# Patient Record
Sex: MALE | Employment: FULL TIME | URBAN - METROPOLITAN AREA
[De-identification: names, ages, dates, MRNs, and addresses within clinical notes are randomized per-mention and may not be internally consistent; named-entity substitution may affect disease eponyms.]

---

## 2018-03-09 ENCOUNTER — TELEPHONE (OUTPATIENT)
Dept: OTHER | Facility: CLINIC | Age: 38
End: 2018-03-09

## 2018-05-31 ENCOUNTER — OFFICE VISIT (OUTPATIENT)
Dept: FAMILY MEDICINE | Facility: CLINIC | Age: 38
End: 2018-05-31
Payer: COMMERCIAL

## 2018-05-31 VITALS
HEIGHT: 72 IN | DIASTOLIC BLOOD PRESSURE: 67 MMHG | SYSTOLIC BLOOD PRESSURE: 109 MMHG | WEIGHT: 166 LBS | HEART RATE: 60 BPM | OXYGEN SATURATION: 97 % | TEMPERATURE: 98.1 F | BODY MASS INDEX: 22.48 KG/M2

## 2018-05-31 DIAGNOSIS — R14.2 FLATULENCE, ERUCTATION, AND GAS PAIN: Primary | ICD-10-CM

## 2018-05-31 DIAGNOSIS — R14.3 FLATULENCE, ERUCTATION, AND GAS PAIN: Primary | ICD-10-CM

## 2018-05-31 DIAGNOSIS — R14.1 FLATULENCE, ERUCTATION, AND GAS PAIN: Primary | ICD-10-CM

## 2018-05-31 LAB
BASOPHILS # BLD AUTO: 0 10E9/L (ref 0–0.2)
BASOPHILS NFR BLD AUTO: 0.2 %
DIFFERENTIAL METHOD BLD: ABNORMAL
EOSINOPHIL # BLD AUTO: 0.2 10E9/L (ref 0–0.7)
EOSINOPHIL NFR BLD AUTO: 4.2 %
ERYTHROCYTE [DISTWIDTH] IN BLOOD BY AUTOMATED COUNT: 12.4 % (ref 10–15)
ERYTHROCYTE [SEDIMENTATION RATE] IN BLOOD BY WESTERGREN METHOD: 9 MM/H (ref 0–15)
HCT VFR BLD AUTO: 42.9 % (ref 40–53)
HGB BLD-MCNC: 14.8 G/DL (ref 13.3–17.7)
LYMPHOCYTES # BLD AUTO: 1.7 10E9/L (ref 0.8–5.3)
LYMPHOCYTES NFR BLD AUTO: 34.5 %
MCH RBC QN AUTO: 31.2 PG (ref 26.5–33)
MCHC RBC AUTO-ENTMCNC: 34.5 G/DL (ref 31.5–36.5)
MCV RBC AUTO: 91 FL (ref 78–100)
MONOCYTES # BLD AUTO: 0.5 10E9/L (ref 0–1.3)
MONOCYTES NFR BLD AUTO: 9.2 %
NEUTROPHILS # BLD AUTO: 2.6 10E9/L (ref 1.6–8.3)
NEUTROPHILS NFR BLD AUTO: 51.9 %
PLATELET # BLD AUTO: 140 10E9/L (ref 150–450)
RBC # BLD AUTO: 4.74 10E12/L (ref 4.4–5.9)
WBC # BLD AUTO: 5 10E9/L (ref 4–11)

## 2018-05-31 PROCEDURE — 85025 COMPLETE CBC W/AUTO DIFF WBC: CPT | Performed by: FAMILY MEDICINE

## 2018-05-31 PROCEDURE — 83516 IMMUNOASSAY NONANTIBODY: CPT | Performed by: FAMILY MEDICINE

## 2018-05-31 PROCEDURE — 86140 C-REACTIVE PROTEIN: CPT | Performed by: FAMILY MEDICINE

## 2018-05-31 PROCEDURE — 80053 COMPREHEN METABOLIC PANEL: CPT | Performed by: FAMILY MEDICINE

## 2018-05-31 PROCEDURE — 84443 ASSAY THYROID STIM HORMONE: CPT | Performed by: FAMILY MEDICINE

## 2018-05-31 PROCEDURE — 36415 COLL VENOUS BLD VENIPUNCTURE: CPT | Performed by: FAMILY MEDICINE

## 2018-05-31 PROCEDURE — 82784 ASSAY IGA/IGD/IGG/IGM EACH: CPT | Performed by: FAMILY MEDICINE

## 2018-05-31 PROCEDURE — 99203 OFFICE O/P NEW LOW 30 MIN: CPT | Performed by: FAMILY MEDICINE

## 2018-05-31 PROCEDURE — 85652 RBC SED RATE AUTOMATED: CPT | Performed by: FAMILY MEDICINE

## 2018-05-31 NOTE — PATIENT INSTRUCTIONS
Look up gassy foods diet.    [facility]   Discharged by : meggan  Paper scripts provided to patient : none     If you have any questions regarding your visit please contact your care team:     Team Gold                Clinic Hours Telephone Number     Dr. Kayley Chatman, NP 7am-7pm  Monday - Thursday   7am-5pm  Fridays  [phone]   (Appointment scheduling available 24/7)     RN Line  [phone] option 2     Urgent Care - [place] and [place] - 11am-9pm Monday-Friday Saturday-Sunday- 9am-5pm     [place] -   5pm-9pm Monday-Friday Saturday-Sunday- 9am-5pm    [phone] - [place]    [phone] - [place]       For a Price Quote for your services, please call our Consumer Price Line at [phone].     What options do I have for visits at the clinic other than the traditional office visit?     To expand how we care for you, many of our providers are utilizing electronic visits (e-visits) and telephone visits, when medically appropriate, for interactions with their patients rather than a visit in the clinic. We also offer nurse visits for many medical concerns. Just like any other service, we will bill your insurance company for this type of visit based on time spent on the phone with your provider. Not all insurance companies cover these visits. Please check with your medical insurance if this type of visit is covered. You will be responsible for any charges that are not paid by your insurance.   E-visits via Brandwatch: generally incur a $35.00 fee.     Telephone visits:  Time spent on the phone: *charged based on time that is spent on the phone in increments of 10 minutes. Estimated cost:   5-10 mins $30.00   11-20 mins. $59.00   21-30 mins. $85.00       Use Brandwatch (secure email communication and access to your chart) to send your primary care provider a message or make an appointment. Ask someone  on your Team how to sign up for PhysioSonics.     As always, Thank you for trusting us with your health care needs!      [facility]:    Scheduling Appointments  [place]  Call: [phone]    Shane Guevara [facility]  Call: [phone]    [facility]  Call: [phone]    For Gastroenterology referrals   [facility]   Clinics and Surgery [place], 4th Floor   [address] [place] [address]   Appointments: [phone]    WHERE TO GO FOR CARE?  Clinic    Make an appointment if you:       Are sick (cold, cough, flu, sore throat, earache or in pain).       Have a small injury (sprain, small cut, burn or broken bone).       Need a physical exam, Pap smear, vaccine or prescription refill.       Have questions about your health or medicines.    To reach us:      Call [phone] ([phone]). Open 24 hours every day. (For counseling services, call [phone].)    Log into PhysioSonics at EnLink [email or web address]. (Visit [email or web address] to create an account.) Hospital emergency room    An emergency is a serious or life- threatening problem that must be treated right away.    Call [phone] or get to the hospital if you have:      Very bad or sudden:            - Chest pain or pressure         - Bleeding         - Head or belly pain         - Dizziness or trouble seeing, walking or                          Speaking      Problems breathing      Blood in your vomit or you are coughing up blood      A major injury (knocked out, loss of a finger or limb, rape, broken bone protruding from skin)    A mental health crisis. (Or call the Mental Health Crisis line at 1-311.857.4943 or Suicide Prevention Hotline at 1-595.240.8488.)    Open 24 hours every day. You don't need an appointment.     Urgent care    Visit urgent care for sickness or small injuries when the clinic is closed. You don't need an appointment. To check hours or  find an urgent care near you, visit [email or web address]. Online care    Get online care from OnCare for more than 70 common problems, like colds, allergies and infections. Open 24 hours every day at:   [email or web address]   Need help deciding?    For advice about where to be seen, you may call your clinic and ask to speak with a nurse. We're here for you 24 hours every day.         If you are deaf or hard of hearing, please let us know. We provide many free services including sign language interpreters, oral interpreters, TTYs, telephone amplifiers, note takers and written materials.

## 2018-05-31 NOTE — MR AVS SNAPSHOT
After Visit Summary   5/31/2018    Edmund Hernandez    MRN: [id number]           Patient Information     Date Of Birth          1980        Visit Information        Provider Department      5/31/2018 5:00 PM Alonzo Carlin MD [facility]        Today's Diagnoses     Flatulence, eructation, and gas pain    -  1      Care Instructions    Look up gassy foods diet.    [facility]   Discharged by : meggan  Paper scripts provided to patient : none     If you have any questions regarding your visit please contact your care team:     Team Gold                Clinic Hours Telephone Number     Dr. Kayley Chatman NP 7am-7pm  Monday - Thursday   7am-5pm  Fridays  [phone]   (Appointment scheduling available 24/7)     RN Line  [phone] option 2     Urgent Care - [place] - 11am-9pm Monday-Friday Saturday-Sunday- 9am-5pm     [place] -   5pm-9pm Monday-Friday Saturday-Sunday- 9am-5pm    [phone] - [place]    [phone] - [place]       For a Price Quote for your services, please call our Consumer Price Line at [phone].     What options do I have for visits at the clinic other than the traditional office visit?     To expand how we care for you, many of our providers are utilizing electronic visits (e-visits) and telephone visits, when medically appropriate, for interactions with their patients rather than a visit in the clinic. We also offer nurse visits for many medical concerns. Just like any other service, we will bill your insurance company for this type of visit based on time spent on the phone with your provider. Not all insurance companies cover these visits. Please check with your medical insurance if this type of visit is covered. You will be responsible for any charges that are not paid by your insurance.   E-visits via [place]:  generally incur a $35.00 fee.     Telephone visits:  Time spent on the phone: *charged based on time that is spent on the phone in increments of 10 minutes. Estimated cost:   5-10 mins $30.00   11-20 mins. $59.00   21-30 mins. $85.00       Use Trusperhart (secure email communication and access to your chart) to send your primary care provider a message or make an appointment. Ask someone on your Team how to sign up for Linkagoalt.     As always, Thank you for trusting us with your health care needs!      [facility]:    Scheduling Appointments  Gena Gallardo [facility]  Call: [phone]    [facility]  Call: [phone]    [facility]  Call: [phone]    For Gastroenterology referrals   [facility]   Clinics and Surgery Center, 4th Floor   [address] [place] [address]   Appointments: [phone]    WHERE TO GO FOR CARE?  Clinic    Make an appointment if you:       Are sick (cold, cough, flu, sore throat, earache or in pain).       Have a small injury (sprain, small cut, burn or broken bone).       Need a physical exam, Pap smear, vaccine or prescription refill.       Have questions about your health or medicines.    To reach us:      Call [phone] ([phone]). Open 24 hours every day. (For counseling services, call [phone].)    Log into Sensulin at [email or web address] [email or web address]. (Visit [email or web address] to create an account.) Hospital emergency room    An emergency is a serious or life- threatening problem that must be treated right away.    Call [phone] or get to the hospital if you have:      Very bad or sudden:            - Chest pain or pressure         - Bleeding         - Head or belly pain         - Dizziness or trouble seeing, walking or                          Speaking      Problems breathing      Blood in your vomit or you are coughing up blood      A major injury (knocked out, loss of a finger  or limb, rape, broken bone protruding from skin)    A mental health crisis. (Or call the Mental Health Crisis line at 1-913.955.5098 or Suicide Prevention Hotline at 1-832.198.3713.)    Open 24 hours every day. You don't need an appointment.     Urgent care    Visit urgent care for sickness or small injuries when the clinic is closed. You don't need an appointment. To check hours or find an urgent care near you, visit [email or web address]. Online care    Get online [facility] from [facility] for more than 70 common problems, like colds, allergies and infections. Open 24 hours every day at:   [email or web address]   Need help deciding?    For advice about where to be seen, you may call your clinic and ask to speak with a nurse. We're here for you 24 hours every day.         If you are deaf or hard of hearing, please let us know. We provide many free services including sign language interpreters, oral interpreters, TTYs, telephone amplifiers, note takers and written materials.                   Follow-ups after your visit        Additional Services     ALLERGY/ASTHMA ADULT REFERRAL       Your provider has referred you to: [facility]: [facility] [phone]  [email or web address]    Please be aware that coverage of these services is subject to the terms and limitations of your health insurance plan.  Call member services at your health plan with any benefit or coverage questions.      Please bring the following with you to your appointment:    (1) Any X-Rays, CTs or MRIs which have been performed.  Contact the facility where they were done to arrange for  prior to your scheduled appointment.    (2) List of current medications  (3) This referral request   (4) Any documents/labs given to you for this referral            GASTROENTEROLOGY ADULT REF CONSULT ONLY       Preferred Location: [facility], [facility] [phone]      Please be aware that coverage of these services is subject to the terms and  "limitations of your health insurance plan.  Call member services at your health plan with any benefit or coverage questions.  Any procedures must be performed at a [place] facility OR coordinated by your clinic's referral office.    Please bring the following with you to your appointment:    (1) Any X-Rays, CTs or MRIs which have been performed.  Contact the facility where they were done to arrange for  prior to your scheduled appointment.    (2) List of current medications   (3) This referral request   (4) Any documents/labs given to you for this referral                  Who to contact     If you have questions or need follow up information about today's clinic visit or your schedule please contact [facility] directly at [phone].  Normal or non-critical lab and imaging results will be communicated to you by Quidhart, letter or phone within 4 business days after the clinic has received the results. If you do not hear from us within 7 days, please contact the clinic through Quidhart or phone. If you have a critical or abnormal lab result, we will notify you by phone as soon as possible.  Submit refill requests through HowAboutWe or call your pharmacy and they will forward the refill request to us. Please allow 3 business days for your refill to be completed.          Additional Information About Your Visit        [employer]id[employer] Information     HowAboutWe lets you send messages to your doctor, view your test results, renew your prescriptions, schedule appointments and more. To sign up, go to [email or web address] . Click on \"Log in\" on the left side of the screen, which will take you to the Welcome page. Then click on \"Sign up Now\" on the right side of the page.     You will be asked to enter the access code listed below, as well as some personal information. Please follow the directions to create your username and password.     Your access code is: [email or web address]  Expires: 8/29/2018  4:39 PM   "   Your access code will  in 90 days. If you need help or a new code, please call your [place] clinic or [phone].        Care EveryWhere ID     This is your Care EveryWhere ID. This could be used by other organizations to access your [place] medical records  [id number]        Your Vitals Were     Pulse Temperature Height Pulse Oximetry BMI (Body Mass Index)       60 98.1  F (36.7  C) (Oral) 6' (1.829 m) 97% 22.51 kg/m2        Blood Pressure from Last 3 Encounters:   18 109/67    Weight from Last 3 Encounters:   18 166 lb (75.3 kg)              We Performed the Following     ALLERGY/ASTHMA ADULT REFERRAL     CBC with platelets and differential     Comprehensive metabolic panel (BMP + Alb, Alk Phos, ALT, AST, Total. Bili, TP)     CRP, inflammation     ESR: Erythrocyte sedimentation rate     GASTROENTEROLOGY ADULT REF CONSULT ONLY     IgA     Tissue transglutaminase vernell IgA and IgG     TSH with free T4 reflex        Primary Care Provider Office Phone # Fax #    [facility] [phone]       [facility] [address]        Equal Access to Services     MIC WILEY : Hadii aad ku hadasho Soomaali, waaxda luqadaha, qaybta kaalmada adeegyada, waxay shireenin haysonian francia dyer . So [facility] [phone].    ATENCIÓN: Si habla español, tiene a herbert disposición servicios gratuitos de asistencia lingüística. Robbyame al [phone].    We comply with applicable federal civil rights laws and [place] laws. We do not discriminate on the basis of race, color, national origin, age, disability, sex, sexual orientation, or gender identity.            Thank you!     Thank you for choosing [facility]  for your care. Our goal is always to provide you with excellent care. Hearing back from our patients is one way we can continue to improve our services. Please take a few minutes to complete the written survey that you may receive in the mail  after your visit with us. Thank you!             Your Updated Medication List - Protect others around you: Learn how to safely use, store and throw away your medicines at www.disposemymeds.org.      Notice  As of 5/31/2018  5:57 PM    You have not been prescribed any medications.

## 2018-05-31 NOTE — PROGRESS NOTES
SUBJECTIVE:   Edmund Hernandez is a 38 year old male who presents to clinic today for the following health issues:      Concern - bloated   Onset: several years    Description:   Bloated all the times with some gas    Intensity: moderate    Progression of Symptoms:  worsening    Accompanying Signs & Symptoms:  none    Previous history of similar problem:   Going on for about 5 years     Precipitating factors:   Worsened by: eating     Alleviating factors:  Improved by: going to the bathroom     Therapies Tried and outcome: OTC anti-gas - did not help with symptoms     Patient reports post prandial bloating and discomfort, frequently requiring prolonged trips to the bathroom (up to 30 minutes) where he will either pass flatus or stool with complete relief of symptoms.  Going on many years but becoming more intrusive and affecting activities.  Will also happen at night and wake him from sleep.  Never had any blood in the stool.  Weight stable over long term.  No upper symptoms of nausea or vomiting and the stools are not diarrheal in nature.  Pain noted with bloating and need to go to bathroom.    Has not seen provider for this before.  No skin lesions, joint issues, conjunctivitis, oral lesions or urethral pain/discharge.  No family hx of bowel issues.  Tries to eat healthy.  Only dairy is with breakfast.  Not clear if fruits and vegetables increase the symptoms.  Between episodes he feels fine.  May not be daily episodes but more days than not.  Used to eat more western diet but his wife is also from [place] and she does more of the cooking now so more Turkish style food at this time.    Patient also mentioned long hx of low platelets but no known cause or complications.        Problem list and histories reviewed & adjusted, as indicated.  Additional history: as documented    There is no problem list on file for this patient.    Past Surgical History:   Procedure Laterality Date     NO HISTORY OF SURGERY          Social History   Substance Use Topics     Smoking status: Never Smoker     Smokeless tobacco: Never Used     Alcohol use Yes      Comment: few drinks once a month      Family History   Problem Relation Age of Onset     Alzheimer Disease Mother      Prostate Problems Maternal Half-Sister      DIABETES No family hx of          No current outpatient prescriptions on file.     No Known Allergies    Reviewed and updated as needed this visit by clinical staff  Tobacco  Allergies  Meds  Problems  Med Hx  Surg Hx  Fam Hx  Soc Hx        Reviewed and updated as needed this visit by Provider  Tobacco  Allergies  Meds  Problems  Med Hx  Surg Hx  Fam Hx  Soc Hx          ROS:  Constitutional, HEENT, cardiovascular, pulmonary, gi and gu systems are negative, except as otherwise noted.    OBJECTIVE:     /67  Pulse 60  Temp 98.1  F (36.7  C) (Oral)  Ht 6' (1.829 m)  Wt 166 lb (75.3 kg)  SpO2 97%  BMI 22.51 kg/m2  Body mass index is 22.51 kg/(m^2).  GENERAL: healthy, alert and no distress  EYES: Eyes grossly normal to inspection, PERRL and conjunctivae and sclerae normal  HENT: ear canals and TM's normal, nose and mouth without ulcers or lesions  NECK: no adenopathy, no asymmetry, masses, or scars and thyroid normal to palpation  RESP: lungs clear to auscultation - no rales, rhonchi or wheezes  CV: regular rate and rhythm, normal S1 S2, no S3 or S4, no murmur, click or rub, no peripheral edema and peripheral pulses strong  ABDOMEN: soft, nontender, no hepatosplenomegaly, no masses and bowel sounds normal  MS: no gross musculoskeletal defects noted, no edema  SKIN: no suspicious lesions or rashes  NEURO: Normal strength and tone, mentation intact and speech normal  PSYCH: mentation appears normal, affect normal/bright    Diagnostic Test Results:  none     ASSESSMENT/PLAN:             1. Flatulence, eructation, and gas pain  Likely irritable bowel type symptoms.  Expressed some concern about food intolerance  or allergy.  Will check initial labs to eval for certain conditions like IBD and celiac.  Consider allergy and GI referral.  Also advised low gassy foods diet in the short term.  Likes lots of grain products, could be fructose intolerance.  Indications for short term follow up reviewed and deferred decisions about endoscopy to GI as symptoms present for many years and patient has remained very healthy.  - CBC with platelets and differential  - Comprehensive metabolic panel (BMP + Alb, Alk Phos, ALT, AST, Total. Bili, TP)  - TSH with free T4 reflex  - ESR: Erythrocyte sedimentation rate  - CRP, inflammation  - IgA  - Tissue transglutaminase vernell IgA and IgG  - ALLERGY/ASTHMA ADULT REFERRAL  - GASTROENTEROLOGY ADULT REF CONSULT ONLY    CONSULTATION/REFERRAL to allergy and GI    Alonzo Carlin MD  [facility]

## 2018-06-01 LAB
ALBUMIN SERPL-MCNC: 3.9 G/DL (ref 3.4–5)
ALP SERPL-CCNC: 64 U/L (ref 40–150)
ALT SERPL W P-5'-P-CCNC: 35 U/L (ref 0–70)
ANION GAP SERPL CALCULATED.3IONS-SCNC: 8 MMOL/L (ref 3–14)
AST SERPL W P-5'-P-CCNC: 34 U/L (ref 0–45)
BILIRUB SERPL-MCNC: 0.3 MG/DL (ref 0.2–1.3)
BUN SERPL-MCNC: 14 MG/DL (ref 7–30)
CALCIUM SERPL-MCNC: 8.9 MG/DL (ref 8.5–10.1)
CHLORIDE SERPL-SCNC: 105 MMOL/L (ref 94–109)
CO2 SERPL-SCNC: 29 MMOL/L (ref 20–32)
CREAT SERPL-MCNC: 1.15 MG/DL (ref 0.66–1.25)
CRP SERPL-MCNC: <2.9 MG/L (ref 0–8)
GFR SERPL CREATININE-BSD FRML MDRD: 70 ML/MIN/1.7M2
GLUCOSE SERPL-MCNC: 100 MG/DL (ref 70–99)
POTASSIUM SERPL-SCNC: 4 MMOL/L (ref 3.4–5.3)
PROT SERPL-MCNC: 7.2 G/DL (ref 6.8–8.8)
SODIUM SERPL-SCNC: 142 MMOL/L (ref 133–144)
TSH SERPL DL<=0.005 MIU/L-ACNC: 1.68 MU/L (ref 0.4–4)

## 2018-06-03 LAB — IGA SERPL-MCNC: 287 MG/DL (ref 70–380)

## 2018-06-04 LAB
TTG IGA SER-ACNC: <1 U/ML
TTG IGG SER-ACNC: 1 U/ML

## 2019-09-09 NOTE — TELEPHONE ENCOUNTER
RECORDS RECEIVED FROM: Internal    DATE RECEIVED: 9/24/19   NOTES STATUS DETAILS   OFFICE NOTE from referring provider Internal Ov note 5/31/18   OFFICE NOTE from other specialist N/A    DISCHARGE SUMMARY from hospital N/A    OPERATIVE REPORT N/A    MEDICATION LIST N/A         ENDOSCOPY  N/A    COLONOSCOPY N/A    ERCP N/A    EUS N/A    STOOL TESTING N/A    PERTINENT LABS Internal    PATHOLOGY REPORTS (RELATED) N/A    IMAGING (CT, MRI, EGD) N/A      REFERRAL INFORMATION    Date referral was placed: 9/24/19   Date all records received: N/A   Date records were scanned into EPIC: N/A   Date records were sent to Provider to review: N/A   Date and recommendation received from provider:  LETTER SENT  SCHEDULE APPOINTMENT   Date patient was contacted to schedule: 9/6/19

## 2019-09-21 ASSESSMENT — ENCOUNTER SYMPTOMS
VOMITING: 0
BLOATING: 1
BOWEL INCONTINENCE: 0
DIARRHEA: 0
HEARTBURN: 0
RECTAL PAIN: 1
BLOOD IN STOOL: 0
NAUSEA: 0
JAUNDICE: 0
CONSTIPATION: 0
ABDOMINAL PAIN: 1

## 2019-09-24 ENCOUNTER — PRE VISIT (OUTPATIENT)
Dept: GASTROENTEROLOGY | Facility: CLINIC | Age: 39
End: 2019-09-24

## 2019-09-24 ENCOUNTER — OFFICE VISIT (OUTPATIENT)
Dept: GASTROENTEROLOGY | Facility: CLINIC | Age: 39
End: 2019-09-24
Attending: FAMILY MEDICINE

## 2019-09-24 VITALS
HEIGHT: 71 IN | WEIGHT: 158.1 LBS | TEMPERATURE: 98.1 F | SYSTOLIC BLOOD PRESSURE: 121 MMHG | DIASTOLIC BLOOD PRESSURE: 70 MMHG | BODY MASS INDEX: 22.13 KG/M2 | OXYGEN SATURATION: 98 % | RESPIRATION RATE: 16 BRPM | HEART RATE: 57 BPM

## 2019-09-24 DIAGNOSIS — R14.0 POSTPRANDIAL ABDOMINAL BLOATING: Primary | ICD-10-CM

## 2019-09-24 SDOH — HEALTH STABILITY: MENTAL HEALTH: HOW OFTEN DO YOU HAVE 6 OR MORE DRINKS ON ONE OCCASION?: MONTHLY

## 2019-09-24 SDOH — HEALTH STABILITY: MENTAL HEALTH: HOW MANY STANDARD DRINKS CONTAINING ALCOHOL DO YOU HAVE ON A TYPICAL DAY?: 1 OR 2

## 2019-09-24 SDOH — HEALTH STABILITY: MENTAL HEALTH: HOW OFTEN DO YOU HAVE A DRINK CONTAINING ALCOHOL?: 2-4 TIMES A MONTH

## 2019-09-24 ASSESSMENT — MIFFLIN-ST. JEOR: SCORE: 1636.33

## 2019-09-24 ASSESSMENT — PAIN SCALES - GENERAL: PAINLEVEL: NO PAIN (0)

## 2019-09-24 NOTE — PROGRESS NOTES
GI CLINIC VISIT    CC/REFERRING MD:  Alonzo Carlin  REASON FOR CONSULTATION:   The pt is a 39 year old male who I was asked to see in consultation at the request of Dr. Alonzo Carlin for No chief complaint on file.      ASSESSMENT/PLAN:  39-year-old previously healthy [place] male who presents for evaluation of chronic bloating.    Likely represents unction of bloating or irritable bowel syndrome with constipation.  Differential also includes pelvic floor dysfunction, Giardia.     Recent thorough lab work-up by primary care was normal, including albumin, TSH, celiac panel (TTG, IgA), hemoglobin, CRP.  Given his lack of red flag symptoms, it is reasonable to proceed with symptom management rather than endoscopic exam, which he is agreeable with.    -Giardia stool study  -Start MiraLAX, Metamucil  -If no improvement, consider referral to dietician for FODMAP diet  -If still no improvement, there may be some utility for pelvic floor testing after endoscopy.  We briefly discussed this today.    RTC 4 weeks    Thank you for this consultation.  It was a pleasure to participate in the care of this patient; please contact us with any further questions.      Bo Navas MD    Division of Gastroenterology, Hepatology and Nutrition  [facility]    HPI  39-year-old previously healthy [place] male who presents for evaluation of chronic bloating.    Postprandial bloating is been present since approximately 2012.  He notices the sensation to defecate or pass gas 30 minutes after every meal.  He will frequently need to strain to have a bowel movement, at times taking between 30 to 60 minutes to pass gas or stool.  Will often pass flatus with mucus.  He has a regular bowel movement every 1 to 2 days, Gilman scale type IV.  He notes intermittent bleeding and pain from hemorrhoids and will frequently shower after defecation to wash his hemorrhoids.  He has declined a rectal  exam today.  He notices worsening bloating typically with carbohydrates including bread, pasta, pizza.  Also with salad.  He tried a gluten reduced diet did not make a difference.  No weight loss or melena.    He had a baby 2 days ago and just got home yesterday from the hospital.  Everyone is healthy.    ROS:    No fevers or chills  No weight loss  No blurry vision, double vision or change in vision  No sore throat  No lymphadenopathy  No headache, paraesthesias, or weakness in a limb  No shortness of breath or wheezing  No chest pain or pressure  No arthralgias or myalgias  No rashes or skin changes  No odynophagia or dysphagia  No BRBPR, hematochezia, melena  No dysuria, frequency or urgency  No hot/cold intolerance or polyria  No anxiety or depression    PROBLEM LIST  Hemorrhoids  Bloating    PERTINENT PAST MEDICAL HISTORY:  Previously healthy    PREVIOUS SURGERIES:  Past Surgical History:   Procedure Laterality Date     NO HISTORY OF SURGERY       PREVIOUS ENDOSCOPY:  None    ALLERGIES:   No Known Allergies    PERTINENT MEDICATIONS:  Occasional Tylenol.    SOCIAL HISTORY:  Social History     Socioeconomic History     Marital status:      Spouse name: Not on file     Number of children: 0     Years of education: Not on file     Highest education level: Not on file   Occupational History     Occupation:      Comment: biomedical research   Social Needs     Financial resource strain: Not on file     Food insecurity:     Worry: Not on file     Inability: Not on file     Transportation needs:     Medical: Not on file     Non-medical: Not on file   Tobacco Use     Smoking status: Never Smoker     Smokeless tobacco: Never Used   Substance and Sexual Activity     Alcohol use: Yes     Comment: few drinks once a month      Drug use: No     Sexual activity: Yes     Partners: Female   Lifestyle     Physical activity:     Days per week: Not on file     Minutes per session: Not on file     Stress:  Not on file   Relationships     Social connections:     Talks on phone: Not on file     Gets together: Not on file     Attends Congregation service: Not on file     Active member of club or organization: Not on file     Attends meetings of clubs or organizations: Not on file     Relationship status: Not on file     Intimate partner violence:     Fear of current or ex partner: Not on file     Emotionally abused: Not on file     Physically abused: Not on file     Forced sexual activity: Not on file   Other Topics Concern     Parent/sibling w/ CABG, MI or angioplasty before 65F 55M? No   Social History Narrative     Not on file     FAMILY HISTORY:  Family History   Problem Relation Age of Onset     Alzheimer Disease Mother      Prostate Problems Maternal Half-Sister      Diabetes No family hx of    No family history of colorectal cancer.    Past/family/social history reviewed and no changes    PHYSICAL EXAMINATION:  Constitutional: aaox3, cooperative, pleasant, not dyspneic/diaphoretic, no acute distress  Vitals reviewed: There were no vitals taken for this visit.  Wt:   Wt Readings from Last 2 Encounters:   05/31/18 75.3 kg (166 lb)      Eyes: Sclera anicteric/injected  Ears/nose/mouth/throat: Normal oropharynx without ulcers or exudate, mucus membranes moist, hearing intact  Neck: supple, thyroid normal size  CV: No edema  Respiratory: Unlabored breathing  Lymph: No cervical lymphadenopathy  Abd: Nondistended, +bs, no hepatosplenomegaly, nontender, no peritoneal signs  Skin: warm, perfused, no jaundice  Psych: Normal affect  MSK: Normal gait    PERTINENT STUDIES:  Office Visit on 05/31/2018   Component Date Value Ref Range Status     WBC 05/31/2018 5.0  4.0 - 11.0 10e9/L Final     RBC Count 05/31/2018 4.74  4.4 - 5.9 10e12/L Final     Hemoglobin 05/31/2018 14.8  13.3 - 17.7 g/dL Final     Hematocrit 05/31/2018 42.9  40.0 - 53.0 % Final     MCV 05/31/2018 91  78 - 100 fl Final     MCH 05/31/2018 31.2  26.5 - 33.0 pg  Final     MCHC 05/31/2018 34.5  31.5 - 36.5 g/dL Final     RDW 05/31/2018 12.4  10.0 - 15.0 % Final     Platelet Count 05/31/2018 140* 150 - 450 10e9/L Final     Diff Method 05/31/2018 Automated Method   Final     % Neutrophils 05/31/2018 51.9  % Final     % Lymphocytes 05/31/2018 34.5  % Final     % Monocytes 05/31/2018 9.2  % Final     % Eosinophils 05/31/2018 4.2  % Final     % Basophils 05/31/2018 0.2  % Final     Absolute Neutrophil 05/31/2018 2.6  1.6 - 8.3 10e9/L Final     Absolute Lymphocytes 05/31/2018 1.7  0.8 - 5.3 10e9/L Final     Absolute Monocytes 05/31/2018 0.5  0.0 - 1.3 10e9/L Final     Absolute Eosinophils 05/31/2018 0.2  0.0 - 0.7 10e9/L Final     Absolute Basophils 05/31/2018 0.0  0.0 - 0.2 10e9/L Final     Sodium 05/31/2018 142  133 - 144 mmol/L Final     Potassium 05/31/2018 4.0  3.4 - 5.3 mmol/L Final     Chloride 05/31/2018 105  94 - 109 mmol/L Final     Carbon Dioxide 05/31/2018 29  20 - 32 mmol/L Final     Anion Gap 05/31/2018 8  3 - 14 mmol/L Final     Glucose 05/31/2018 100* 70 - 99 mg/dL Final     Urea Nitrogen 05/31/2018 14  7 - 30 mg/dL Final     Creatinine 05/31/2018 1.15  0.66 - 1.25 mg/dL Final     GFR Estimate 05/31/2018 70  >60 mL/min/1.7m2 Final     GFR Estimate If Black 05/31/2018 85  >60 mL/min/1.7m2 Final     Calcium 05/31/2018 8.9  8.5 - 10.1 mg/dL Final     Bilirubin Total 05/31/2018 0.3  0.2 - 1.3 mg/dL Final     Albumin 05/31/2018 3.9  3.4 - 5.0 g/dL Final     Protein Total 05/31/2018 7.2  6.8 - 8.8 g/dL Final     Alkaline Phosphatase 05/31/2018 64  40 - 150 U/L Final     ALT 05/31/2018 35  0 - 70 U/L Final     AST 05/31/2018 34  0 - 45 U/L Final     TSH 05/31/2018 1.68  0.40 - 4.00 mU/L Final     Sed Rate 05/31/2018 9  0 - 15 mm/h Final     CRP Inflammation 05/31/2018 <2.9  0.0 - 8.0 mg/L Final     IGA 05/31/2018 287  70 - 380 mg/dL Final     Tissue Transglutaminase Antibody I* 05/31/2018 <1  <7 U/mL Final     Tissue Transglutaminase Lorena IgG 05/31/2018 1  <7 U/mL Final        Imaging: No pertinent abdominal imaging to review.      Answers for HPI/ROS submitted by the patient on 9/21/2019   General Symptoms: No  Skin Symptoms: No  HENT Symptoms: No  EYE SYMPTOMS: No  HEART SYMPTOMS: No  LUNG SYMPTOMS: No  INTESTINAL SYMPTOMS: Yes  URINARY SYMPTOMS: No  REPRODUCTIVE SYMPTOMS: No  SKELETAL SYMPTOMS: No  BLOOD SYMPTOMS: No  NERVOUS SYSTEM SYMPTOMS: No  MENTAL HEALTH SYMPTOMS: No  Heart burn or indigestion: No  Nausea: No  Vomiting: No  Abdominal pain: Yes  Bloating: Yes  Constipation: No  Diarrhea: No  Blood in stool: No  Black stools: No  Rectal or Anal pain: Yes  Fecal incontinence: No  Yellowing of skin or eyes: No  Vomit with blood: No  Change in stools: No

## 2019-09-24 NOTE — LETTER
9/24/2019       RE: Edmund Hernandez  [address] [facility] [address] [facility] [address]     Dear Colleague,    Thank you for referring your patient, Edmund Hernandez, to the [facility] GASTROENTEROLOGY AND IBD CLINIC at [facility]. Please see a copy of my visit note below.    GI CLINIC VISIT    CC/REFERRING MD:  Alonzo Carlin  REASON FOR CONSULTATION:   The pt is a 39 year old male who I was asked to see in consultation at the request of Dr. Alonzo Carlin for No chief complaint on file.    ASSESSMENT/PLAN:  39-year-old previously healthy [place]ian male who presents for evaluation of chronic bloating.    Likely represents unction of bloating or irritable bowel syndrome with constipation.  Differential also includes pelvic floor dysfunction, Giardia.     Recent thorough lab work-up by primary care was normal, including albumin, TSH, celiac panel (TTG, IgA), hemoglobin, CRP.  Given his lack of red flag symptoms, it is reasonable to proceed with symptom management rather than endoscopic exam, which he is agreeable with.    -Giardia stool study  -Start MiraLAX, Metamucil  -If no improvement, consider referral to dietician for FODMAP diet  -If still no improvement, there may be some utility for pelvic floor testing after endoscopy.  We briefly discussed this today.    RTC 4 weeks    Thank you for this consultation.  It was a pleasure to participate in the care of this patient; please contact us with any further questions.      Bo Navas MD    Division of Gastroenterology, Hepatology and Nutrition  [facility]    HPI  39-year-old previously healthy [place] male who presents for evaluation of chronic bloating.    Postprandial bloating is been present since approximately 2012.  He notices the sensation to defecate or pass gas 30 minutes after every meal.  He will frequently need to strain to have a bowel movement, at times taking between 30 to 60  minutes to pass gas or stool.  Will often pass flatus with mucus.  He has a regular bowel movement every 1 to 2 days, Emmons scale type IV.  He notes intermittent bleeding and pain from hemorrhoids and will frequently shower after defecation to wash his hemorrhoids.  He has declined a rectal exam today.  He notices worsening bloating typically with carbohydrates including bread, pasta, pizza.  Also with salad.  He tried a gluten reduced diet did not make a difference.  No weight loss or melena.    He had a baby 2 days ago and just got home yesterday from the hospital.  Everyone is healthy.    ROS:    No fevers or chills  No weight loss  No blurry vision, double vision or change in vision  No sore throat  No lymphadenopathy  No headache, paraesthesias, or weakness in a limb  No shortness of breath or wheezing  No chest pain or pressure  No arthralgias or myalgias  No rashes or skin changes  No odynophagia or dysphagia  No BRBPR, hematochezia, melena  No dysuria, frequency or urgency  No hot/cold intolerance or polyria  No anxiety or depression    PROBLEM LIST  Hemorrhoids  Bloating    PERTINENT PAST MEDICAL HISTORY:  Previously healthy    PREVIOUS SURGERIES:  Past Surgical History:   Procedure Laterality Date     NO HISTORY OF SURGERY       PREVIOUS ENDOSCOPY:  None    ALLERGIES:   No Known Allergies    PERTINENT MEDICATIONS:  Occasional Tylenol.    SOCIAL HISTORY:  Social History     Socioeconomic History     Marital status:      Spouse name: Not on file     Number of children: 0     Years of education: Not on file     Highest education level: Not on file   Occupational History     Occupation:      Comment: biomedical research   Social Needs     Financial resource strain: Not on file     Food insecurity:     Worry: Not on file     Inability: Not on file     Transportation needs:     Medical: Not on file     Non-medical: Not on file   Tobacco Use     Smoking status: Never Smoker      Smokeless tobacco: Never Used   Substance and Sexual Activity     Alcohol use: Yes     Comment: few drinks once a month      Drug use: No     Sexual activity: Yes     Partners: Female   Lifestyle     Physical activity:     Days per week: Not on file     Minutes per session: Not on file     Stress: Not on file   Relationships     Social connections:     Talks on phone: Not on file     Gets together: Not on file     Attends [employer] service: Not on file     Active member of club or organization: Not on file     Attends meetings of clubs or organizations: Not on file     Relationship status: Not on file     Intimate partner violence:     Fear of current or ex partner: Not on file     Emotionally abused: Not on file     Physically abused: Not on file     Forced sexual activity: Not on file   Other Topics Concern     Parent/sibling w/ CABG, MI or angioplasty before 65F 55M? No   Social History Narrative     Not on file     FAMILY HISTORY:  Family History   Problem Relation Age of Onset     Alzheimer Disease Mother      Prostate Problems Maternal Half-Sister      Diabetes No family hx of    No family history of colorectal cancer.    Past/family/social history reviewed and no changes    PHYSICAL EXAMINATION:  Constitutional: aaox3, cooperative, pleasant, not dyspneic/diaphoretic, no acute distress  Vitals reviewed: There were no vitals taken for this visit.  Wt:   Wt Readings from Last 2 Encounters:   05/31/18 75.3 kg (166 lb)      Eyes: Sclera anicteric/injected  Ears/nose/mouth/throat: Normal oropharynx without ulcers or exudate, mucus membranes moist, hearing intact  Neck: supple, thyroid normal size  CV: No edema  Respiratory: Unlabored breathing  Lymph: No cervical lymphadenopathy  Abd: Nondistended, +bs, no hepatosplenomegaly, nontender, no peritoneal signs  Skin: warm, perfused, no jaundice  Psych: Normal affect  MSK: Normal gait    PERTINENT STUDIES:  Office Visit on 05/31/2018   Component Date Value Ref Range  Status     WBC 05/31/2018 5.0  4.0 - 11.0 10e9/L Final     RBC Count 05/31/2018 4.74  4.4 - 5.9 10e12/L Final     Hemoglobin 05/31/2018 14.8  13.3 - 17.7 g/dL Final     Hematocrit 05/31/2018 42.9  40.0 - 53.0 % Final     MCV 05/31/2018 91  78 - 100 fl Final     MCH 05/31/2018 31.2  26.5 - 33.0 pg Final     MCHC 05/31/2018 34.5  31.5 - 36.5 g/dL Final     RDW 05/31/2018 12.4  10.0 - 15.0 % Final     Platelet Count 05/31/2018 140* 150 - 450 10e9/L Final     Diff Method 05/31/2018 Automated Method   Final     % Neutrophils 05/31/2018 51.9  % Final     % Lymphocytes 05/31/2018 34.5  % Final     % Monocytes 05/31/2018 9.2  % Final     % Eosinophils 05/31/2018 4.2  % Final     % Basophils 05/31/2018 0.2  % Final     Absolute Neutrophil 05/31/2018 2.6  1.6 - 8.3 10e9/L Final     Absolute Lymphocytes 05/31/2018 1.7  0.8 - 5.3 10e9/L Final     Absolute Monocytes 05/31/2018 0.5  0.0 - 1.3 10e9/L Final     Absolute Eosinophils 05/31/2018 0.2  0.0 - 0.7 10e9/L Final     Absolute Basophils 05/31/2018 0.0  0.0 - 0.2 10e9/L Final     Sodium 05/31/2018 142  133 - 144 mmol/L Final     Potassium 05/31/2018 4.0  3.4 - 5.3 mmol/L Final     Chloride 05/31/2018 105  94 - 109 mmol/L Final     Carbon Dioxide 05/31/2018 29  20 - 32 mmol/L Final     Anion Gap 05/31/2018 8  3 - 14 mmol/L Final     Glucose 05/31/2018 100* 70 - 99 mg/dL Final     Urea Nitrogen 05/31/2018 14  7 - 30 mg/dL Final     Creatinine 05/31/2018 1.15  0.66 - 1.25 mg/dL Final     GFR Estimate 05/31/2018 70  >60 mL/min/1.7m2 Final     GFR Estimate If Black 05/31/2018 85  >60 mL/min/1.7m2 Final     Calcium 05/31/2018 8.9  8.5 - 10.1 mg/dL Final     Bilirubin Total 05/31/2018 0.3  0.2 - 1.3 mg/dL Final     Albumin 05/31/2018 3.9  3.4 - 5.0 g/dL Final     Protein Total 05/31/2018 7.2  6.8 - 8.8 g/dL Final     Alkaline Phosphatase 05/31/2018 64  40 - 150 U/L Final     ALT 05/31/2018 35  0 - 70 U/L Final     AST 05/31/2018 34  0 - 45 U/L Final     TSH 05/31/2018 1.68  0.40 -  4.00 mU/L Final     Sed Rate 05/31/2018 9  0 - 15 mm/h Final     CRP Inflammation 05/31/2018 <2.9  0.0 - 8.0 mg/L Final     IGA 05/31/2018 287  70 - 380 mg/dL Final     Tissue Transglutaminase Antibody I* 05/31/2018 <1  <7 U/mL Final     Tissue Transglutaminase Lorena IgG 05/31/2018 1  <7 U/mL Final       Imaging: No pertinent abdominal imaging to review.    Again, thank you for allowing me to participate in the care of your patient.      Sincerely,    Bo Navas MD

## 2019-09-24 NOTE — NURSING NOTE
"Chief Complaint   Patient presents with     New Patient     Consult for gas and gurgling       Vitals:    09/24/19 1239   BP: 121/70   BP Location: Left arm   Patient Position: Sitting   Cuff Size: Adult Regular   Pulse: 57   Resp: 16   Temp: 98.1  F (36.7  C)   TempSrc: Oral   SpO2: 98%   Weight: 71.7 kg (158 lb 1.6 oz)   Height: 1.791 m (5' 10.5\")       Body mass index is 22.36 kg/m .      Aleyda Aceves LPN                          "

## 2019-09-24 NOTE — PATIENT INSTRUCTIONS
It was a pleasure taking care of you today.  I've included a brief summary of our discussion and care plan from today's visit below.  Please review this information with your primary care provider.  _______________________________________________________________________    My recommendations are summarized as follows:    1. Start polyethylene glycol (Miralax) once per day, try in afternoon.     2. Start psyllium fiber (Metamucil) 1-2 tablespoons per day in the morning, 3 days after starting Miralax.       3. Avoid trouble foods, we can discuss a referral to a registered dietician for a FODMAP diet.     4. Bring back a Giardia stool test.     -You may  your stool sample containers at Lab 1st Floor  before you leave today.  You may drop off the stool samples at any [facility]  -    Return to GI Clinic in 1 month to review your progress with me or Whit Benson.     _______________________________________________________________________    Who do I call with any questions after my visit?  Please be in touch if there are any further questions that arise following today's visit.  There are multiple ways to contact your gastroenterology care team.        During business hours, you may reach a Gastroenterology nurse at [phone] and choose option 3.         To schedule or reschedule an appointment, please call [phone].       You can always send a secure message through Agorafy.  Agorafy messages are answered by your nurse or doctor typically within 24 hours.  Please allow extra time on weekends and holidays.        For urgent/emergent questions after business hours, you may reach the on-call GI Fellow by contacting the [facility] at [phone].     How will I get the results of any tests ordered?    You will receive all of your results.  If you have signed up for MyChart, any tests ordered at your visit will be available to you after your physician reviews them.  Typically this  takes 1-2 weeks.  If there are urgent results that require a change in your care plan, your physician or nurse will call you to discuss the next steps.      What is Azoi?  Azoi is a secure way for you to access all of your healthcare records from the [facility].  It is a web based computer program, so you can sign on to it from any location.  It also allows you to send secure messages to your care team.  I recommend signing up for Azoi access if you have not already done so and are comfortable with using a computer.      How to I schedule a follow-up visit?  If you did not schedule a follow-up visit today, please call [phone] to schedule a follow-up office visit.        Sincerely,    Bo Navas MD     [facility]  Division of Gastroenterology

## 2019-09-26 DIAGNOSIS — R14.0 POSTPRANDIAL ABDOMINAL BLOATING: ICD-10-CM

## 2019-09-27 LAB
C PARVUM AG STL QL IA: NEGATIVE
G LAMBLIA AG STL QL IA: NEGATIVE
SPECIMEN SOURCE: NORMAL

## 2019-10-18 ENCOUNTER — TELEPHONE (OUTPATIENT)
Dept: GASTROENTEROLOGY | Facility: CLINIC | Age: 39
End: 2019-10-18

## 2019-10-18 NOTE — TELEPHONE ENCOUNTER
-- Message is from the [facility]--    Reason for Call: Omar the Nurse  from [facility] called wanting to know if the patient is doing Chemo. Please advise     Caller Information       Type Contact Phone    04/04/2019 03:28 PM Phone (Incoming) Omar Bhatti (Network) [phone]          Alternative phone number: none    Turnaround time given to caller:   \"This message will be sent to [state Provider's name]. The clinical team will fulfill your request as soon as they review your message.\"     Attempt to reach patient reminding of appointment scheduled on 10/21/19 at 3pm with [facility]. No answer, no voice mail to leave message.    ANEESH Erickson     Abdominal pain

## 2019-10-21 ENCOUNTER — OFFICE VISIT (OUTPATIENT)
Dept: GASTROENTEROLOGY | Facility: CLINIC | Age: 39
End: 2019-10-21

## 2019-10-21 VITALS
SYSTOLIC BLOOD PRESSURE: 118 MMHG | HEIGHT: 71 IN | BODY MASS INDEX: 22.5 KG/M2 | HEART RATE: 77 BPM | TEMPERATURE: 98.4 F | OXYGEN SATURATION: 97 % | DIASTOLIC BLOOD PRESSURE: 66 MMHG | WEIGHT: 160.7 LBS

## 2019-10-21 DIAGNOSIS — K58.9 IRRITABLE BOWEL SYNDROME, UNSPECIFIED TYPE: ICD-10-CM

## 2019-10-21 DIAGNOSIS — R14.0 ABDOMINAL BLOATING: ICD-10-CM

## 2019-10-21 ASSESSMENT — PAIN SCALES - GENERAL: PAINLEVEL: NO PAIN (0)

## 2019-10-21 ASSESSMENT — MIFFLIN-ST. JEOR: SCORE: 1648.12

## 2019-10-21 NOTE — PROGRESS NOTES
"GI CLINIC VISIT    CC/REFERRING MD:  Alonzo Carlin  REASON FOR CONSULTATION: follow-up bloating     ASSESSMENT/PLAN:  1. Bloating, abdominal discomfort  Has had unremarkable previous workup including albumin, TSH, Celiac disease, hgb, CRP, giardia. Symptoms likely due to irritable bowel syndrome w/ constipation vs. Mixed. May also be due to food sensitivity. Discussed plan to gentley increase fiber supplementation to improve consistency and regularity. May also benefit from peppermint to help with abdominal discomfort. Discussed that he could investigate low FODMAP diet and low fructose diet (given reports of sensitivity to certain types of sugars)- can discuss further with dietitian. Future considerations include pelvic floor evaluation, colonoscopy given history of BRBPR (patient wishes to defer today, believes this is from hemorrhoids), or serotonin specific reuptake inhibitor for functional bloating.     Plan:   -- Consider low FODMAP- could do strict elimination diet, or try one food at a time    -- could meet with Kiersten Sheth   -- can try OTC IBGard for discomfort   -- Avoid foods that have high fructose (and more fructose (with or without sorbitol) than glucose)   -- eliminate foods sweetened with \"fructose\" or \"crystalline fructose.\"   -- avoid Juices and fruits containing high net amounts of fructose (eg, apples, pears, sweet cherries, prunes, dates), and beverages sweetened with high fructose corn syrup.   -- avoid honey    -- Sorbitol-containing candies and gums-- may be labeled as sugarless   --continue 1 capful of Miralax  -- increase fiber supplementation to 3 teaspoons daily (2 in the morning, 1 in the afternoon with Miralax)     RTC 3-4 months     Thank you for this consultation.  It was a pleasure to participate in the care of this patient; please contact us with any further questions.      A total of 30 face-to-face minutes was spent with this patient, >50% of which was counseling " regarding the above delineated issues.    Whit Benson PA-C  Division of Gastroenterology, Hepatology & Nutrition  [facility]      HPI  39-year-old previously healthy [place] male who presents for follow up for chronic bloating. Has previously been seen by Dr. Navas and this is my first encounter with the patient.     Reports post-prandial abdominal bloating since 2012, with sensation to defecate or pass gas 30 minutes after eating. Reported straining with bowel movements, prolonged time sitting on the toilet to pass gas or stool. Reported BM 1-2 days, has hemorrhoids and will frequently shower after defecation to wash hemorrhoids.     He notices worsening bloating typically with carbohydrates including bread, pasta, pizza, salad, certain types of artifical sugars, meats.  He tried a gluten reduced diet did not make a difference.     After his last visit started on Miralax (1 capful daily), metamucil (2 teaspoons, misses doses occasionally). Notes slightly easier to pass bowel movement after starting this regimen. Otherwise reports continued bloating. Consistency: sometimes just mucus comes out, sometimes diarrhea mixed with fragmented stool. Sometimes every day, sometimes just gas. Easier to pass a bowel, less straining. Does feel empty with bowel movement, can take long time.     ROS:    No fevers or chills  No weight loss  No blurry vision, double vision or change in vision  No sore throat  No lymphadenopathy  No headache, paraesthesias, or weakness in a limb  No shortness of breath or wheezing  No chest pain or pressure  No arthralgias or myalgias  No rashes or skin changes  No odynophagia or dysphagia  No BRBPR, hematochezia, melena  No dysuria, frequency or urgency  No hot/cold intolerance or polyria  No anxiety or depression    PROBLEM LIST  Hemorrhoids  Bloating    PERTINENT PAST MEDICAL HISTORY:  Previously healthy    PREVIOUS SURGERIES:  Past Surgical History:   Procedure Laterality  Date     NO HISTORY OF SURGERY       PREVIOUS ENDOSCOPY:  None    ALLERGIES:   No Known Allergies    PERTINENT MEDICATIONS:  Occasional Tylenol.    SOCIAL HISTORY:  Social History     Socioeconomic History     Marital status:      Spouse name: Not on file     Number of children: 0     Years of education: Not on file     Highest education level: Not on file   Occupational History     Occupation:      Comment: biomedical research   Social Needs     Financial resource strain: Not on file     Food insecurity:     Worry: Not on file     Inability: Not on file     Transportation needs:     Medical: Not on file     Non-medical: Not on file   Tobacco Use     Smoking status: Former Smoker     Packs/day: 0.00     Types: Cigarettes     Start date: 1996     Last attempt to quit: 2006     Years since quittin.8     Smokeless tobacco: Never Used   Substance and Sexual Activity     Alcohol use: Yes     Alcohol/week: 1.0 - 3.0 standard drinks     Types: 1 - 3 Shots of liquor per week     Frequency: 2-4 times a month     Drinks per session: 1 or 2     Binge frequency: Monthly     Comment: few drinks once a month      Drug use: No     Sexual activity: Yes     Partners: Female   Lifestyle     Physical activity:     Days per week: Not on file     Minutes per session: Not on file     Stress: Not on file   Relationships     Social connections:     Talks on phone: Not on file     Gets together: Not on file     Attends Restorationist service: Not on file     Active member of club or organization: Not on file     Attends meetings of clubs or organizations: Not on file     Relationship status: Not on file     Intimate partner violence:     Fear of current or ex partner: Not on file     Emotionally abused: Not on file     Physically abused: Not on file     Forced sexual activity: Not on file   Other Topics Concern     Parent/sibling w/ CABG, MI or angioplasty before 65F 55M? No   Social History Narrative      "Not on file     FAMILY HISTORY:  Family History   Problem Relation Age of Onset     Alzheimer Disease Mother      Prostate Problems Maternal Half-Sister      Diabetes No family hx of    No family history of colorectal cancer.    PHYSICAL EXAMINATION:  Constitutional: aaox3, cooperative, pleasant, not dyspneic/diaphoretic, no acute distress  Vitals reviewed: /66   Pulse 77   Temp 98.4  F (36.9  C) (Oral)   Ht 1.791 m (5' 10.5\")   Wt 72.9 kg (160 lb 11.2 oz)   SpO2 97%   BMI 22.73 kg/m    Wt:   Wt Readings from Last 2 Encounters:   10/21/19 72.9 kg (160 lb 11.2 oz)   09/24/19 71.7 kg (158 lb 1.6 oz)      Eyes: Sclera anicteric/injected  Ears/nose/mouth/throat: Normal oropharynx without ulcers or exudate, mucus membranes moist, hearing intact  CV: No edema  Respiratory: Unlabored breathing  Lymph: No cervical lymphadenopathy  Abd: Non-distended   Skin: warm, perfused, no jaundice  Psych: Normal affect  MSK: Normal gait    PERTINENT STUDIES:  Office Visit on 05/31/2018   Component Date Value Ref Range Status     WBC 05/31/2018 5.0  4.0 - 11.0 10e9/L Final     RBC Count 05/31/2018 4.74  4.4 - 5.9 10e12/L Final     Hemoglobin 05/31/2018 14.8  13.3 - 17.7 g/dL Final     Hematocrit 05/31/2018 42.9  40.0 - 53.0 % Final     MCV 05/31/2018 91  78 - 100 fl Final     MCH 05/31/2018 31.2  26.5 - 33.0 pg Final     MCHC 05/31/2018 34.5  31.5 - 36.5 g/dL Final     RDW 05/31/2018 12.4  10.0 - 15.0 % Final     Platelet Count 05/31/2018 140* 150 - 450 10e9/L Final     Diff Method 05/31/2018 Automated Method   Final     % Neutrophils 05/31/2018 51.9  % Final     % Lymphocytes 05/31/2018 34.5  % Final     % Monocytes 05/31/2018 9.2  % Final     % Eosinophils 05/31/2018 4.2  % Final     % Basophils 05/31/2018 0.2  % Final     Absolute Neutrophil 05/31/2018 2.6  1.6 - 8.3 10e9/L Final     Absolute Lymphocytes 05/31/2018 1.7  0.8 - 5.3 10e9/L Final     Absolute Monocytes 05/31/2018 0.5  0.0 - 1.3 10e9/L Final     Absolute " Eosinophils 05/31/2018 0.2  0.0 - 0.7 10e9/L Final     Absolute Basophils 05/31/2018 0.0  0.0 - 0.2 10e9/L Final     Sodium 05/31/2018 142  133 - 144 mmol/L Final     Potassium 05/31/2018 4.0  3.4 - 5.3 mmol/L Final     Chloride 05/31/2018 105  94 - 109 mmol/L Final     Carbon Dioxide 05/31/2018 29  20 - 32 mmol/L Final     Anion Gap 05/31/2018 8  3 - 14 mmol/L Final     Glucose 05/31/2018 100* 70 - 99 mg/dL Final     Urea Nitrogen 05/31/2018 14  7 - 30 mg/dL Final     Creatinine 05/31/2018 1.15  0.66 - 1.25 mg/dL Final     GFR Estimate 05/31/2018 70  >60 mL/min/1.7m2 Final     GFR Estimate If Black 05/31/2018 85  >60 mL/min/1.7m2 Final     Calcium 05/31/2018 8.9  8.5 - 10.1 mg/dL Final     Bilirubin Total 05/31/2018 0.3  0.2 - 1.3 mg/dL Final     Albumin 05/31/2018 3.9  3.4 - 5.0 g/dL Final     Protein Total 05/31/2018 7.2  6.8 - 8.8 g/dL Final     Alkaline Phosphatase 05/31/2018 64  40 - 150 U/L Final     ALT 05/31/2018 35  0 - 70 U/L Final     AST 05/31/2018 34  0 - 45 U/L Final     TSH 05/31/2018 1.68  0.40 - 4.00 mU/L Final     Sed Rate 05/31/2018 9  0 - 15 mm/h Final     CRP Inflammation 05/31/2018 <2.9  0.0 - 8.0 mg/L Final     IGA 05/31/2018 287  70 - 380 mg/dL Final     Tissue Transglutaminase Antibody I* 05/31/2018 <1  <7 U/mL Final     Tissue Transglutaminase Lorena IgG 05/31/2018 1  <7 U/mL Final       Imaging: No pertinent abdominal imaging to review.

## 2019-10-21 NOTE — LETTER
"10/21/2019       RE: Edmund Hernandez  [address] [address]     Dear Colleague,    Thank you for referring your patient, Edmund Hernandez, to the [facility] GASTROENTEROLOGY AND IBD CLINIC at [facility]. Please see a copy of my visit note below.    GI CLINIC VISIT    CC/REFERRING MD:  Alonzo Carlin  REASON FOR CONSULTATION: follow-up bloating     ASSESSMENT/PLAN:  1. Bloating, abdominal discomfort  Has had unremarkable previous workup including albumin, TSH, Celiac disease, hgb, CRP, giardia. Symptoms likely due to irritable bowel syndrome w/ constipation vs. Mixed. May also be due to food sensitivity. Discussed plan to gentley increase fiber supplementation to improve consistency and regularity. May also benefit from peppermint to help with abdominal discomfort. Discussed that he could investigate low FODMAP diet and low fructose diet (given reports of sensitivity to certain types of sugars)- can discuss further with dietitian. Future considerations include pelvic floor evaluation, colonoscopy given history of BRBPR (patient wishes to defer today, believes this is from hemorrhoids), or serotonin specific reuptake inhibitor for functional bloating.     Plan:   -- Consider low FODMAP- could do strict elimination diet, or try one food at a time    -- could meet with Kiersten Sheth   -- can try OTC IBGard for discomfort   -- Avoid foods that have high fructose (and more fructose (with or without sorbitol) than glucose)   -- eliminate foods sweetened with \"fructose\" or \"crystalline fructose.\"   -- avoid Juices and fruits containing high net amounts of fructose (eg, apples, pears, sweet cherries, prunes, dates), and beverages sweetened with high fructose corn syrup.   -- avoid honey    -- Sorbitol-containing candies and gums-- may be labeled as sugarless   --continue 1 capful of Miralax  -- increase fiber supplementation to 3 teaspoons daily (2 in the morning, 1 " in the afternoon with Miralax)     RTC 3-4 months     Thank you for this consultation.  It was a pleasure to participate in the care of this patient; please contact us with any further questions.      A total of 30 face-to-face minutes was spent with this patient, >50% of which was counseling regarding the above delineated issues.    Whit Benson PA-C  Division of Gastroenterology, Hepatology & Nutrition  [facility]    HPI  39-year-old previously healthy [place] male who presents for follow up for chronic bloating. Has previously been seen by Dr. Navas and this is my first encounter with the patient.     Reports post-prandial abdominal bloating since 2012, with sensation to defecate or pass gas 30 minutes after eating. Reported straining with bowel movements, prolonged time sitting on the toilet to pass gas or stool. Reported BM 1-2 days, has hemorrhoids and will frequently shower after defecation to wash hemorrhoids.     He notices worsening bloating typically with carbohydrates including bread, pasta, pizza, salad, certain types of artifical sugars, meats.  He tried a gluten reduced diet did not make a difference.     After his last visit started on Miralax (1 capful daily), metamucil (2 teaspoons, misses doses occasionally). Notes slightly easier to pass bowel movement after starting this regimen. Otherwise reports continued bloating. Consistency: sometimes just mucus comes out, sometimes diarrhea mixed with fragmented stool. Sometimes every day, sometimes just gas. Easier to pass a bowel, less straining. Does feel empty with bowel movement, can take long time.     ROS:    No fevers or chills  No weight loss  No blurry vision, double vision or change in vision  No sore throat  No lymphadenopathy  No headache, paraesthesias, or weakness in a limb  No shortness of breath or wheezing  No chest pain or pressure  No arthralgias or myalgias  No rashes or skin changes  No odynophagia or dysphagia  No  BRBPR, hematochezia, melena  No dysuria, frequency or urgency  No hot/cold intolerance or polyria  No anxiety or depression    PROBLEM LIST  Hemorrhoids  Bloating    PERTINENT PAST MEDICAL HISTORY:  Previously healthy    PREVIOUS SURGERIES:  Past Surgical History:   Procedure Laterality Date     NO HISTORY OF SURGERY       PREVIOUS ENDOSCOPY:  None    ALLERGIES:   No Known Allergies    PERTINENT MEDICATIONS:  Occasional Tylenol.    SOCIAL HISTORY:  Social History     Socioeconomic History     Marital status:      Spouse name: Not on file     Number of children: 0     Years of education: Not on file     Highest education level: Not on file   Occupational History     Occupation:      Comment: biomedical research   Social Needs     Financial resource strain: Not on file     Food insecurity:     Worry: Not on file     Inability: Not on file     Transportation needs:     Medical: Not on file     Non-medical: Not on file   Tobacco Use     Smoking status: Former Smoker     Packs/day: 0.00     Types: Cigarettes     Start date: 1996     Last attempt to quit: 2006     Years since quittin.8     Smokeless tobacco: Never Used   Substance and Sexual Activity     Alcohol use: Yes     Alcohol/week: 1.0 - 3.0 standard drinks     Types: 1 - 3 Shots of liquor per week     Frequency: 2-4 times a month     Drinks per session: 1 or 2     Binge frequency: Monthly     Comment: few drinks once a month      Drug use: No     Sexual activity: Yes     Partners: Female   Lifestyle     Physical activity:     Days per week: Not on file     Minutes per session: Not on file     Stress: Not on file   Relationships     Social connections:     Talks on phone: Not on file     Gets together: Not on file     Attends Mormonism service: Not on file     Active member of club or organization: Not on file     Attends meetings of clubs or organizations: Not on file     Relationship status: Not on file     Intimate partner  "violence:     Fear of current or ex partner: Not on file     Emotionally abused: Not on file     Physically abused: Not on file     Forced sexual activity: Not on file   Other Topics Concern     Parent/sibling w/ CABG, MI or angioplasty before 65F 55M? No   Social History Narrative     Not on file     FAMILY HISTORY:  Family History   Problem Relation Age of Onset     Alzheimer Disease Mother      Prostate Problems Maternal Half-Sister      Diabetes No family hx of    No family history of colorectal cancer.    PHYSICAL EXAMINATION:  Constitutional: aaox3, cooperative, pleasant, not dyspneic/diaphoretic, no acute distress  Vitals reviewed: /66   Pulse 77   Temp 98.4  F (36.9  C) (Oral)   Ht 1.791 m (5' 10.5\")   Wt 72.9 kg (160 lb 11.2 oz)   SpO2 97%   BMI 22.73 kg/m     Wt:   Wt Readings from Last 2 Encounters:   10/21/19 72.9 kg (160 lb 11.2 oz)   09/24/19 71.7 kg (158 lb 1.6 oz)      Eyes: Sclera anicteric/injected  Ears/nose/mouth/throat: Normal oropharynx without ulcers or exudate, mucus membranes moist, hearing intact  CV: No edema  Respiratory: Unlabored breathing  Lymph: No cervical lymphadenopathy  Abd: Non-distended   Skin: warm, perfused, no jaundice  Psych: Normal affect  MSK: Normal gait    PERTINENT STUDIES:  Office Visit on 05/31/2018   Component Date Value Ref Range Status     WBC 05/31/2018 5.0  4.0 - 11.0 10e9/L Final     RBC Count 05/31/2018 4.74  4.4 - 5.9 10e12/L Final     Hemoglobin 05/31/2018 14.8  13.3 - 17.7 g/dL Final     Hematocrit 05/31/2018 42.9  40.0 - 53.0 % Final     MCV 05/31/2018 91  78 - 100 fl Final     MCH 05/31/2018 31.2  26.5 - 33.0 pg Final     MCHC 05/31/2018 34.5  31.5 - 36.5 g/dL Final     RDW 05/31/2018 12.4  10.0 - 15.0 % Final     Platelet Count 05/31/2018 140* 150 - 450 10e9/L Final     Diff Method 05/31/2018 Automated Method   Final     % Neutrophils 05/31/2018 51.9  % Final     % Lymphocytes 05/31/2018 34.5  % Final     % Monocytes 05/31/2018 9.2  % Final "     % Eosinophils 05/31/2018 4.2  % Final     % Basophils 05/31/2018 0.2  % Final     Absolute Neutrophil 05/31/2018 2.6  1.6 - 8.3 10e9/L Final     Absolute Lymphocytes 05/31/2018 1.7  0.8 - 5.3 10e9/L Final     Absolute Monocytes 05/31/2018 0.5  0.0 - 1.3 10e9/L Final     Absolute Eosinophils 05/31/2018 0.2  0.0 - 0.7 10e9/L Final     Absolute Basophils 05/31/2018 0.0  0.0 - 0.2 10e9/L Final     Sodium 05/31/2018 142  133 - 144 mmol/L Final     Potassium 05/31/2018 4.0  3.4 - 5.3 mmol/L Final     Chloride 05/31/2018 105  94 - 109 mmol/L Final     Carbon Dioxide 05/31/2018 29  20 - 32 mmol/L Final     Anion Gap 05/31/2018 8  3 - 14 mmol/L Final     Glucose 05/31/2018 100* 70 - 99 mg/dL Final     Urea Nitrogen 05/31/2018 14  7 - 30 mg/dL Final     Creatinine 05/31/2018 1.15  0.66 - 1.25 mg/dL Final     GFR Estimate 05/31/2018 70  >60 mL/min/1.7m2 Final     GFR Estimate If Black 05/31/2018 85  >60 mL/min/1.7m2 Final     Calcium 05/31/2018 8.9  8.5 - 10.1 mg/dL Final     Bilirubin Total 05/31/2018 0.3  0.2 - 1.3 mg/dL Final     Albumin 05/31/2018 3.9  3.4 - 5.0 g/dL Final     Protein Total 05/31/2018 7.2  6.8 - 8.8 g/dL Final     Alkaline Phosphatase 05/31/2018 64  40 - 150 U/L Final     ALT 05/31/2018 35  0 - 70 U/L Final     AST 05/31/2018 34  0 - 45 U/L Final     TSH 05/31/2018 1.68  0.40 - 4.00 mU/L Final     Sed Rate 05/31/2018 9  0 - 15 mm/h Final     CRP Inflammation 05/31/2018 <2.9  0.0 - 8.0 mg/L Final     IGA 05/31/2018 287  70 - 380 mg/dL Final     Tissue Transglutaminase Antibody I* 05/31/2018 <1  <7 U/mL Final     Tissue Transglutaminase Lorena IgG 05/31/2018 1  <7 U/mL Final       Imaging: No pertinent abdominal imaging to review.    Again, thank you for allowing me to participate in the care of your patient.      Sincerely,    Whit Benson PA-C

## 2019-10-21 NOTE — NURSING NOTE
"Chief Complaint   Patient presents with     RECHECK     1 month follow up       Vitals:    10/21/19 1445   BP: 118/66   Pulse: 77   Temp: 98.4  F (36.9  C)   TempSrc: Oral   SpO2: 97%   Weight: 72.9 kg (160 lb 11.2 oz)   Height: 1.791 m (5' 10.5\")       Body mass index is 22.73 kg/m .    Alexandra Schmitt CMA    "

## 2019-10-21 NOTE — PATIENT INSTRUCTIONS
"It was a pleasure taking care of you today.  I've included a brief summary of our discussion and care plan from today's visit below.  Please review this information with your primary care provider.  ______________________________________________________________________    My recommendations are summarized as follows:    -- Consider low FODMAP- could do strict elimination diet, or try one food at a time    -- could meet with Kiersten Sheth     -- can try OTC IBGard for discomfort     Avoid foods that have high fructose (and more fructose (with or without sorbitol) than glucose)  -- eliminate foods sweetened with \"fructose\" or \"crystalline fructose.\"  -- avoid Juices and fruits containing high net amounts of fructose (eg, apples, pears, sweet cherries, prunes, dates), and beverages sweetened with high fructose corn syrup.  -- avoid honey   -- Sorbitol-containing candies and gums-- may be labeled as sugarless     --continue 1 capful of Miralax    -- increase fiber supplementation to 3 teaspoons daily (2 in the morning, 1 in the afternoon with Miralax)     Return to GI Clinic in 3-4 months to review your progress.    ______________________________________________________________________    Who do I call with any questions after my visit?  Please be in touch if there are any further questions that arise following today's visit.  There are multiple ways to contact your gastroenterology care team.        During business hours, you may reach a Gastroenterology nurse at [phone]      To schedule or reschedule an appointment, please call [phone].       You can always send a secure message through [employer].  [employer] messages are answered by your nurse or doctor typically within 24 hours.  Please allow extra time on weekends and holidays.        For urgent/emergent questions after business hours, you may reach the on-call GI Fellow by contacting the [facility]  at [phone].     How will I get the " results of any tests ordered?    You will receive all of your results.  If you have signed up for [employer], any tests ordered at your visit will be available to you after your physician reviews them.  Typically this takes 1-2 weeks.  If there are urgent results that require a change in your care plan, your physician or nurse will call you to discuss the next steps.      What is [employer]?  [employer] is a secure way for you to access all of your healthcare records from the [facility].  It is a web based computer program, so you can sign on to it from any location.  It also allows you to send secure messages to your care team.  I recommend signing up for [employer] access if you have not already done so and are comfortable with using a computer.      How to I schedule a follow-up visit?  If you did not schedule a follow-up visit today, please call [phone] to schedule a follow-up office visit.        Sincerely,    Whit Benson PA-C  Division of Gastroenterology, Hepatology & Nutrition  [facility]

## 2020-02-24 ENCOUNTER — OFFICE VISIT (OUTPATIENT)
Dept: FAMILY MEDICINE | Facility: CLINIC | Age: 40
End: 2020-02-24
Payer: COMMERCIAL

## 2020-02-24 VITALS
HEART RATE: 67 BPM | HEIGHT: 71 IN | WEIGHT: 167 LBS | TEMPERATURE: 97.4 F | BODY MASS INDEX: 23.38 KG/M2 | DIASTOLIC BLOOD PRESSURE: 63 MMHG | OXYGEN SATURATION: 97 % | SYSTOLIC BLOOD PRESSURE: 111 MMHG

## 2020-02-24 DIAGNOSIS — Z00.00 ROUTINE GENERAL MEDICAL EXAMINATION AT A HEALTH CARE FACILITY: Primary | ICD-10-CM

## 2020-02-24 DIAGNOSIS — K58.1 IRRITABLE BOWEL SYNDROME WITH CONSTIPATION: ICD-10-CM

## 2020-02-24 DIAGNOSIS — Z23 NEED FOR TDAP VACCINATION: ICD-10-CM

## 2020-02-24 PROCEDURE — 90471 IMMUNIZATION ADMIN: CPT | Performed by: FAMILY MEDICINE

## 2020-02-24 PROCEDURE — 90715 TDAP VACCINE 7 YRS/> IM: CPT | Performed by: FAMILY MEDICINE

## 2020-02-24 PROCEDURE — 99396 PREV VISIT EST AGE 40-64: CPT | Mod: 25 | Performed by: FAMILY MEDICINE

## 2020-02-24 RX ORDER — POLYETHYLENE GLYCOL 3350 17 G/17G
1 POWDER, FOR SOLUTION ORAL DAILY
COMMUNITY

## 2020-02-24 ASSESSMENT — ENCOUNTER SYMPTOMS
NAUSEA: 0
DYSURIA: 0
PALPITATIONS: 0
ABDOMINAL PAIN: 1
FREQUENCY: 0
CONSTIPATION: 0
ARTHRALGIAS: 0
JOINT SWELLING: 0
DIZZINESS: 0
DIARRHEA: 0
HEMATOCHEZIA: 0
FEVER: 0
EYE PAIN: 0
HEADACHES: 0
HEMATURIA: 0
SORE THROAT: 0
CHILLS: 0
PARESTHESIAS: 0
WEAKNESS: 0
MYALGIAS: 0
NERVOUS/ANXIOUS: 0
SHORTNESS OF BREATH: 0
HEARTBURN: 0
COUGH: 0

## 2020-02-24 ASSESSMENT — MIFFLIN-ST. JEOR: SCORE: 1676.7

## 2020-02-24 ASSESSMENT — PAIN SCALES - GENERAL: PAINLEVEL: NO PAIN (0)

## 2020-02-24 NOTE — NURSING NOTE
Prior to immunization administration, verified patients identity using patient s name and date of birth. Please see Immunization Activity for additional information.     Screening Questionnaire for Adult Immunization    Are you sick today?   No   Do you have allergies to medications, food, a vaccine component or latex?   No   Have you ever had a serious reaction after receiving a vaccination?   No   Do you have a long-term health problem with heart, lung, kidney, or metabolic disease (e.g., diabetes), asthma, a blood disorder, no spleen, complement component deficiency, a cochlear implant, or a spinal fluid leak?  Are you on long-term aspirin therapy?   No   Do you have cancer, leukemia, HIV/AIDS, or any other immune system problem?   No   Do you have a parent, brother, or sister with an immune system problem?   No   In the past 3 months, have you taken medications that affect  your immune system, such as prednisone, other steroids, or anticancer drugs; drugs for the treatment of rheumatoid arthritis, Crohn s disease, or psoriasis; or have you had radiation treatments?   No   Have you had a seizure, or a brain or other nervous system problem?   No   During the past year, have you received a transfusion of blood or blood    products, or been given immune (gamma) globulin or antiviral drug?   No   For women: Are you pregnant or is there a chance you could become       pregnant during the next month?   No   Have you received any vaccinations in the past 4 weeks?   No     Immunization questionnaire answers were all negative.        Per orders of Dr. Carlin, injection of Tdap given by Cecelia Stinson. Patient instructed to remain in clinic for 15 minutes afterwards, and to report any adverse reaction to me immediately.    Vaccine information supplied.       Screening performed by Cecelia Stinson on 2/24/2020 at 10:02 AM.

## 2020-02-24 NOTE — PROGRESS NOTES
SUBJECTIVE:   CC: Edmund Hernandez is an 39 year old male who presents for preventative health visit.     Healthy Habits:     Getting at least 3 servings of Calcium per day:  Yes    Bi-annual eye exam:  NO    Dental care twice a year:  Yes    Sleep apnea or symptoms of sleep apnea:  None    Diet:  Regular (no restrictions)    Frequency of exercise:  4-5 days/week    Duration of exercise:  30-45 minutes    Taking medications regularly:  Yes    Medication side effects:  None    PHQ-2 Total Score: 0    Additional concerns today:  No              Today's PHQ-2 Score:   PHQ-2 (  [employer]) 2020   Q1: Little interest or pleasure in doing things 0   Q2: Feeling down, depressed or hopeless 0   PHQ-2 Score 0   Q1: Little interest or pleasure in doing things Not at all   Q2: Feeling down, depressed or hopeless Not at all   PHQ-2 Score 0       Abuse: Current or Past(Physical, Sexual or Emotional)- No  Do you feel safe in your environment? Yes        Social History     Tobacco Use     Smoking status: Former Smoker     Packs/day: 0.00     Types: Cigarettes     Start date: 1996     Last attempt to quit: 2006     Years since quittin.1     Smokeless tobacco: Never Used   Substance Use Topics     Alcohol use: Yes     Alcohol/week: 1.0 - 3.0 standard drinks     Types: 1 - 3 Shots of liquor per week     Frequency: 2-4 times a month     Drinks per session: 1 or 2     Binge frequency: Monthly     Comment: few drinks once a month        Alcohol Use 2020   Prescreen: >3 drinks/day or >7 drinks/week? No   Prescreen: >3 drinks/day or >7 drinks/week? -   No flowsheet data found.    Cecelia Stinson MA    He has some questions today about prostate cancer screening due to a family history in 2 maternal uncles.  Patient's father has had some prostate problems and surgery but not cancer.  The patient did take some Propecia for a number of years, quit about 5 years ago and wondered if that might affect his risk.  He  completed biometric screening through his job at the [employer] in 2019 and that was updated to reflect blood sugar and lipid panel.  They were normal.    He does take MiraLAX as needed for the IBS symptoms and has seen gastroenterology for this.  Doing well at this time.    Last PSA: No results found for: PSA    Reviewed orders with patient. Reviewed health maintenance and updated orders accordingly - Yes  Patient Active Problem List   Diagnosis     Irritable bowel syndrome with constipation     Past Surgical History:   Procedure Laterality Date     NO HISTORY OF SURGERY         Social History     Tobacco Use     Smoking status: Former Smoker     Packs/day: 0.00     Types: Cigarettes     Start date: 1996     Last attempt to quit: 2006     Years since quittin.1     Smokeless tobacco: Never Used   Substance Use Topics     Alcohol use: Yes     Alcohol/week: 1.0 - 3.0 standard drinks     Types: 1 - 3 Shots of liquor per week     Frequency: 2-4 times a month     Drinks per session: 1 or 2     Binge frequency: Monthly     Comment: few drinks once a month      Family History   Problem Relation Age of Onset     Alzheimer Disease Mother      Prostate Problems Father      Prostate Problems Maternal Half-Sister      Prostate Cancer Maternal Uncle          at age 80's     Prostate Cancer Maternal Uncle          at age 65+     Diabetes No family hx of          Current Outpatient Medications   Medication Sig Dispense Refill     polyethylene glycol (MIRALAX) powder Take 1 capful by mouth daily       No Known Allergies    Reviewed and updated as needed this visit by clinical staff  Tobacco  Allergies  Meds  Problems  Med Hx  Surg Hx  Fam Hx  Soc Hx          Reviewed and updated as needed this visit by Provider  Tobacco  Allergies  Meds  Problems  Med Hx  Surg Hx  Fam Hx  Soc Hx             Review of Systems   Constitutional: Negative for chills and fever.   HENT: Negative for congestion,  "ear pain, hearing loss and sore throat.    Eyes: Negative for pain and visual disturbance.   Respiratory: Negative for cough and shortness of breath.    Cardiovascular: Negative for chest pain, palpitations and peripheral edema.   Gastrointestinal: Positive for abdominal pain. Negative for constipation, diarrhea, heartburn, hematochezia and nausea.   Genitourinary: Negative for discharge, dysuria, frequency, genital sores, hematuria, impotence and urgency.   Musculoskeletal: Negative for arthralgias, joint swelling and myalgias.   Skin: Negative for rash.   Neurological: Negative for dizziness, weakness, headaches and paresthesias.   Psychiatric/Behavioral: Negative for mood changes. The patient is not nervous/anxious.          OBJECTIVE:   /63 (BP Location: Left arm, Patient Position: Chair, Cuff Size: Adult Regular)   Pulse 67   Temp 97.4  F (36.3  C) (Oral)   Ht 1.791 m (5' 10.5\")   Wt 75.8 kg (167 lb)   SpO2 97%   BMI 23.62 kg/m      Physical Exam  GENERAL: healthy, alert and no distress  EYES: Eyes grossly normal to inspection, PERRL and conjunctivae and sclerae normal  HENT: ear canals and TM's normal, nose and mouth without ulcers or lesions  NECK: no adenopathy, no asymmetry, masses, or scars and thyroid normal to palpation  RESP: lungs clear to auscultation - no rales, rhonchi or wheezes  CV: regular rate and rhythm, normal S1 S2, no S3 or S4, no murmur, click or rub, no peripheral edema and peripheral pulses strong  ABDOMEN: soft, nontender, no hepatosplenomegaly, no masses and bowel sounds normal   (male): normal male genitalia without lesions or urethral discharge, no hernia  MS: no gross musculoskeletal defects noted, no edema  SKIN: no suspicious lesions or rashes  NEURO: Normal strength and tone, mentation intact and speech normal  PSYCH: mentation appears normal, affect normal/bright    Diagnostic Test Results:  Labs reviewed in Epic    ASSESSMENT/PLAN:   1. Routine general medical " "examination at a health care facility  Routine health issues appropriate for age were reviewed today.  Follow-up is recommended in 1 year.  Specific questions about prostate and other cancer screenings were reviewed in the context of his family history and personal health.    2. Irritable bowel syndrome with constipation  Mostly doing well at this time and will continue with the MiraLAX as needed.    3. Need for Tdap vaccination  Tdap vaccine was provided today.  - TDAP, IM (10 - 64 YRS) - Adacel  - ADMIN 1st VACCINE    COUNSELING:   Reviewed preventive health counseling, as reflected in patient instructions       Regular exercise       Healthy diet/nutrition       Vision screening       Hearing screening       Immunizations    Vaccinated for: TDAP             Safe sex practices/STD prevention       HIV screeninx in teen years, 1x in adult years, and at intervals if high risk       Colon cancer screening       Prostate cancer screening    Estimated body mass index is 23.62 kg/m  as calculated from the following:    Height as of this encounter: 1.791 m (5' 10.5\").    Weight as of this encounter: 75.8 kg (167 lb).          reports that he quit smoking about 14 years ago. His smoking use included cigarettes. He started smoking about 24 years ago. He smoked 0.00 packs per day. He has never used smokeless tobacco.      Counseling Resources:  ATP IV Guidelines  Pooled Cohorts Equation Calculator  FRAX Risk Assessment  ICSI Preventive Guidelines  Dietary Guidelines for Americans, 2010  USDA's MyPlate  ASA Prophylaxis  Lung CA Screening    Alonzo Carlin MD  [facility]  "

## 2020-07-04 ENCOUNTER — MYC MEDICAL ADVICE (OUTPATIENT)
Dept: FAMILY MEDICINE | Facility: CLINIC | Age: 40
End: 2020-07-04

## 2020-07-06 NOTE — TELEPHONE ENCOUNTER
[employer] message sent with the information below.    Edmund,    You need to go to the ER to be evaluated for loss of blood.  You can also call us at [phone].    Please follow the advise below:    Are you having any light-headedness or fainting, vomiting blood or coffee-grounds like vomit, intermittent abdominal pain, frequent black or tarry stools, large amount of bright red blood mixed in the stool or passing of clots, use of blood thinners, steroids, non steroidal anti-inflammatory medications, or large doses of Asprin?    f you are having these symptoms, you need to go to ER now.        If you have a temperature >100 degrees, recent history of cancer?    Please make an appointment with provider within 24 hours.    Are you having any:  Stool streaked with red blood, blood on toilet tissue after wiping, constipation or hemorrhoids, bleeding persisits > 2-3 days after constipation resolves, taking iron preparations or pepto-bismol, recent ingestion of beets or spinach?    *Please let us know and make an appointment to be seen.      Here are some home care instructions for you:  This is for hemorrhoids or a sore rectal area.    Soak in a warm saline bath for 20  Minutes to cleanse the area and promote healing (add 2 TBSP of salt or baking soda to the water).   Keep rectal area clean.  May use medicated pads (tucks) to cleanse and sooth area: follow instructions on the label.  Ask pharmacist for additional product suggestions.  If rectal area is irritated, apply over the counter hydrocortisone ointment (Anusol-HC, Cortaid), or zinc oxide paste or powder.  If hemorrhoids persist, try over the counter preparations (Anusol, Nupercainal, Preparation H) to help soothe and shrink hemorrhoids.  Follow instructions on the bottle.  Increase fluid intake and ear a diet monica in fiber: fruits vegetables, bran, grains, and beans.  Avoid constipating foods such as cheese.  This is particularly important if taking narcotic pain  medications.  If taking iron preparations or pepto-Bismol, or eating spinach or better, follow up with your provider.      Telephone Triage Protocols for Nurses, Fifth edition:  Protocol: rectal bleeding pages 510-512.      Martha Ragsdale RN,BSN  Triage Nurse  [facility]  Appointment line: [phone]  [facility], 24 hour nurse line, available by calling clinic at [phone] and following prompts.

## 2020-07-06 NOTE — TELEPHONE ENCOUNTER
Patient read his [employer] message.  Martha Ragsdale,RN,BSN  [facility]

## 2020-07-14 ENCOUNTER — OFFICE VISIT (OUTPATIENT)
Dept: FAMILY MEDICINE | Facility: CLINIC | Age: 40
End: 2020-07-14
Payer: COMMERCIAL

## 2020-07-14 VITALS
TEMPERATURE: 98.3 F | WEIGHT: 164 LBS | BODY MASS INDEX: 23.2 KG/M2 | HEART RATE: 63 BPM | OXYGEN SATURATION: 98 % | DIASTOLIC BLOOD PRESSURE: 71 MMHG | SYSTOLIC BLOOD PRESSURE: 113 MMHG

## 2020-07-14 DIAGNOSIS — K64.4 EXTERNAL HEMORRHOIDS: Primary | ICD-10-CM

## 2020-07-14 PROCEDURE — 99213 OFFICE O/P EST LOW 20 MIN: CPT | Performed by: FAMILY MEDICINE

## 2020-07-14 ASSESSMENT — PAIN SCALES - GENERAL: PAINLEVEL: NO PAIN (0)

## 2020-07-14 NOTE — PROGRESS NOTES
Subjective     Edmund Hernandez is a 40 year old male who presents to clinic today for the following health issues:    HPI       Hemorrhoids       Duration: 10 days, stopped the last 2-3 days    Description:   Pain: YES- with activity  Itching: no     Accompanying signs and symptoms:   Blood in stool: YES  Changes in stool pattern: no     History (similar episodes/previous evaluation): Has IBS, stated since  he has had hemorrhoids but not as bad as recent 2 years    Precipitating or alleviating factors: Running, long walks    Therapies tried and outcome: Bathing, lying down    Cecelia Stinson MA    Patient reports a history of close to 10 years with intermittent rectal bleeding.  It is always bright red and sometimes will actually drain into the toilet as if it is actively bleeding.  For the last 10 days or so this is been happening with every bowel movement with some mild to moderate pain.  Numerous activities seem to aggravate it.  He has IBS so tends to stool anywhere from 2-3 times per day.  He thinks this is 1 of the worst episodes.  He has had some pain and also what he thinks might be a prolapsed internal hemorrhoid.  It requires that he lay down in order for this to proceed back into the rectum.  He has not had an examination for this or had any treatments previously.  It is very distressing for him and his spouse.  He is worried that he may have lost up to a pint of blood due to the recent bleeding.      Patient Active Problem List   Diagnosis     Irritable bowel syndrome with constipation     Past Surgical History:   Procedure Laterality Date     NO HISTORY OF SURGERY         Social History     Tobacco Use     Smoking status: Former Smoker     Packs/day: 0.00     Types: Cigarettes     Start date: 1996     Last attempt to quit: 2006     Years since quittin.5     Smokeless tobacco: Never Used   Substance Use Topics     Alcohol use: Yes     Alcohol/week: 1.0 - 3.0 standard drinks     Types: 1  - 3 Shots of liquor per week     Frequency: 2-4 times a month     Drinks per session: 1 or 2     Binge frequency: Monthly     Comment: few drinks once a month      Family History   Problem Relation Age of Onset     Alzheimer Disease Mother      Prostate Problems Father      Prostate Problems Maternal Half-Sister      Prostate Cancer Maternal Uncle          at age 80's     Prostate Cancer Maternal Uncle          at age 65+     Diabetes No family hx of          Current Outpatient Medications   Medication Sig Dispense Refill     polyethylene glycol (MIRALAX) powder Take 1 capful by mouth daily       No Known Allergies    Reviewed and updated as needed this visit by Provider  Tobacco  Meds  Problems  Med Hx  Surg Hx  Fam Hx  Soc Hx        Review of Systems   Constitutional, HEENT, cardiovascular, pulmonary, gi and gu systems are negative, except as otherwise noted.      Objective    /71 (BP Location: Right arm, Patient Position: Chair, Cuff Size: Adult Regular)   Pulse 63   Temp 98.3  F (36.8  C) (Oral)   Wt 74.4 kg (164 lb)   SpO2 98%   BMI 23.20 kg/m    Body mass index is 23.2 kg/m .  Physical Exam   GENERAL: healthy, alert and no distress  EYES: Eyes grossly normal to inspection, PERRL and conjunctivae and sclerae normal  RECTAL (male): Circumferential external hemorrhoids, larger on the left side were appreciated.  No active bleeding or evidence of recent bleeding.  MS: no gross musculoskeletal defects noted, no edema  SKIN: no suspicious lesions or rashes  NEURO: Normal strength and tone, mentation intact and speech normal  PSYCH: mentation appears normal, affect normal/bright    Diagnostic Test Results:  Labs reviewed in Epic        Assessment & Plan     1. External hemorrhoids  He does seem to have significantly bothersome external hemorrhoids and also what sounds like some internal hemorrhoids.  It is been quite distressing for him lately with the amount of blood that he thinks he has  been losing.  No active bleeding was noted at this time.  I think referral to colon and rectal surgery for evaluation and discussion of treatment options is most appropriate at this time and offered that referral to the patient.  Recommend he continue to use MiraLAX and psyllium to maximally soften his stools without producing diarrhea and also minimize toilet sitting time.  - COLORECTAL SURGERY REFERRAL           Return in about 8 months (around 3/14/2021).    Alonzo Carlin MD  [facility]

## 2020-07-16 NOTE — TELEPHONE ENCOUNTER
RECORDS RECEIVED FROM: Internal    DATE RECEIVED: 7/17/20    NOTES STATUS DETAILS   OFFICE NOTE from referring provider  Internal OV note 7/14/20

## 2020-07-17 ENCOUNTER — PRE VISIT (OUTPATIENT)
Dept: SURGERY | Facility: CLINIC | Age: 40
End: 2020-07-17

## 2020-07-17 ENCOUNTER — OFFICE VISIT (OUTPATIENT)
Dept: SURGERY | Facility: CLINIC | Age: 40
End: 2020-07-17
Payer: COMMERCIAL

## 2020-07-17 VITALS
HEIGHT: 72 IN | HEART RATE: 63 BPM | WEIGHT: 162.6 LBS | SYSTOLIC BLOOD PRESSURE: 120 MMHG | BODY MASS INDEX: 22.02 KG/M2 | DIASTOLIC BLOOD PRESSURE: 74 MMHG | OXYGEN SATURATION: 97 %

## 2020-07-17 DIAGNOSIS — K64.8 BLEEDING INTERNAL HEMORRHOIDS: Primary | ICD-10-CM

## 2020-07-17 ASSESSMENT — MIFFLIN-ST. JEOR: SCORE: 1675.55

## 2020-07-17 NOTE — PATIENT INSTRUCTIONS
Follow up with Jesenia Das NP in 3-4 weeks.    Please call with any questions or concerns regarding your clinic visit today.    It is a pleasure being involved in your health care.    Contacts post-consultation depending on your need:    Radiology Appointments [phone]    Schedule Clinic Appointments [phone] # 1   M-F 7:30 - 5 pm    BRANDI Zaldivar [phone]    Clinic Fax Number [phone]    Surgery Scheduling [phone]    My Chart is available 24 hours a day and is a secure way to access your records and communicate with your care team.  I strongly recommend signing up if you haven't already done so, if you are comfortable with computers.  If you would like to inquire about this or are having problems with My Chart access, you may call [phone] or go online at [email or web address].  Please allow at least 24 hours for a response and extra time on weekends and Holidays.

## 2020-07-17 NOTE — NURSING NOTE
Chief Complaint   Patient presents with     Consult     hemorrhoids       Vitals:    07/17/20 1017   BP: 120/74   BP Location: Left arm   Patient Position: Sitting   Cuff Size: Adult Regular   Pulse: 63   SpO2: 97%   Weight: 162 lb 9.6 oz   Height: 6'       Body mass index is 22.05 kg/m .      Zen Conklin LPN

## 2020-07-17 NOTE — LETTER
2020       RE: Edmund Hernandez  [address] [address]     Dear Colleague,    Thank you for referring your patient, Edmund Hernandez, to the [facility] COLON AND RECTAL SURGERY at [facility]. Please see a copy of my visit note below.    Colon and Rectal Surgery Clinic Note    RE: Edmund Hernandez.  : 1980.  SRI: 2020.    Reason for visit: Rectal bleeding.    HPI: 40M with history of IBS-C who has noted ~2 weeks of rectal bleeding and tissue prolapse. He states that the bleeding occurs with BMs, and at first was quite a bit with blood dripping into the bowl. He noted a large grape sized tissue prolapse with activity and BMs that would eventually self reduce. The bleeding has slowed and is now just a small amount. He notes a history of hemorrhoids on and off for several years.    BM 1-2 times daily. In the past has sat on the toilet for 30-45 minutes but has been trying to limit this lately.    No blood thinners.    Medical history:  IBS    Surgical history:  Past Surgical History:   Procedure Laterality Date     NO HISTORY OF SURGERY         Family history:  Family History   Problem Relation Age of Onset     Alzheimer Disease Mother      Prostate Problems Father      Prostate Problems Maternal Half-Sister      Prostate Cancer Maternal Uncle          at age 80's     Prostate Cancer Maternal Uncle          at age 65+     Diabetes No family hx of        Medications:  Current Outpatient Medications   Medication Sig Dispense Refill     polyethylene glycol (MIRALAX) powder Take 1 capful by mouth daily         Allergies:  No Known Allergies    Social history:   Social History     Tobacco Use     Smoking status: Former Smoker     Packs/day: 0.00     Types: Cigarettes     Start date: 1996     Last attempt to quit: 2006     Years since quittin.5     Smokeless tobacco: Never Used   Substance Use Topics     Alcohol use: Yes     Alcohol/week:  "1.0 - 3.0 standard drinks     Types: 1 - 3 Shots of liquor per week     Frequency: 2-4 times a month     Drinks per session: 1 or 2     Binge frequency: Monthly     Comment: few drinks once a month      Marital status: .    ROS:  A complete review of systems was performed with the patient and all systems negative except as per HPI.    Physical Examination:  Exam was chaperoned by Zen Conklin LPN  /74 (BP Location: Left arm, Patient Position: Sitting, Cuff Size: Adult Regular)   Pulse 63   Ht 1.829 m (6')   Wt 73.8 kg (162 lb 9.6 oz)   SpO2 97%   BMI 22.05 kg/m    General: Well hydrated. No acute distress.  Abdomen: Soft, NT.  Perianal external examination:  Perianal skin: Intact with no excoriation or lichenification.  Lesions: No evidence of an external lesion, nodularity, or induration in the perianal region.    Digital rectal examination: Was performed.   Sphincter tone: Good.  Palpable lesions: No.    Anoscopy: Was performed.   Hemorrhoids: Yes. Left lateral enlarged, injected, consistent with recent bleeding. Right posterior moderately enlarged without signs of bleeding. Right anterior small.  Lesions: No    Procedures:  After discussing the risks and benefits, the patient agreed to proceed with internal hemorrhoidal banding.    Prior to the start of the procedure and with procedural staff participation, I verbally confirmed the patient s identity using two indicators, relevant allergies, that the procedure was appropriate and matched the consent, and that the correct equipment/implants were available. Immediately prior to starting the procedure I conducted the \"time out\" with the procedural staff and re-confirmed the patient s name, procedure, and site/side.    Sedation (Moderate or Deep): None    A suction hemorrhoidal  was used to place a total of 2 band(s) each in the left lateral and right posterior position(s).    There was no significant bleeding. The patient tolerated the " procedure well.  .    Laboratory values reviewed:  Recent Labs   Lab Test 05/31/18  1801   WBC 5.0   HGB 14.8   *   CR 1.15   ALBUMIN 3.9   BILITOTAL 0.3   ALKPHOS 64   ALT 35   AST 34     ASSESSMENT  40M with bleeding grade 2 internal hemorrhoids LL>>RP>RA. Banding of LL and RP performed in clinic today. Discussed continuing high fiber diet and miralax and minimizing time on the toilet as he is doing. Will have him return in ~4 weeks for recheck and possible repeat banding if needed.    PLAN  1. Follow up with me or Jesenia Das NP in 4 weeks  2. Continue high fiber diet and miralax    Time spent: 30 minutes.  >50% spent in discussing, counseling and coordinating care.    Jeff Charles M.D    Division of Colon and Rectal Surgery  [facility]    Referring Provider:  Alonzo Carlin MD  [address] [place] [address]     Primary Care Provider:  Alonzo Carlin    Again, thank you for allowing me to participate in the care of your patient.      Sincerely,    Jeff Charles MD

## 2020-07-17 NOTE — PROGRESS NOTES
Colon and Rectal Surgery Clinic Note    RE: Edmund Hernandez.  : 1980.  SRI: 2020.    Reason for visit: Rectal bleeding.    HPI: 40M with history of IBS-C who has noted ~2 weeks of rectal bleeding and tissue prolapse. He states that the bleeding occurs with BMs, and at first was quite a bit with blood dripping into the bowl. He noted a large grape sized tissue prolapse with activity and BMs that would eventually self reduce. The bleeding has slowed and is now just a small amount. He notes a history of hemorrhoids on and off for several years.    BM 1-2 times daily. In the past has sat on the toilet for 30-45 minutes but has been trying to limit this lately.    No blood thinners.    Medical history:  IBS    Surgical history:  Past Surgical History:   Procedure Laterality Date     NO HISTORY OF SURGERY         Family history:  Family History   Problem Relation Age of Onset     Alzheimer Disease Mother      Prostate Problems Father      Prostate Problems Maternal Half-Sister      Prostate Cancer Maternal Uncle          at age 80's     Prostate Cancer Maternal Uncle          at age 65+     Diabetes No family hx of        Medications:  Current Outpatient Medications   Medication Sig Dispense Refill     polyethylene glycol (MIRALAX) powder Take 1 capful by mouth daily         Allergies:  No Known Allergies    Social history:   Social History     Tobacco Use     Smoking status: Former Smoker     Packs/day: 0.00     Types: Cigarettes     Start date: 1996     Last attempt to quit: 2006     Years since quittin.5     Smokeless tobacco: Never Used   Substance Use Topics     Alcohol use: Yes     Alcohol/week: 1.0 - 3.0 standard drinks     Types: 1 - 3 Shots of liquor per week     Frequency: 2-4 times a month     Drinks per session: 1 or 2     Binge frequency: Monthly     Comment: few drinks once a month      Marital status: .    ROS:  A complete review of systems was performed with the  "patient and all systems negative except as per HPI.    Physical Examination:  Exam was chaperoned by Zen Conklin LPN  /74 (BP Location: Left arm, Patient Position: Sitting, Cuff Size: Adult Regular)   Pulse 63   Ht 1.829 m (6')   Wt 73.8 kg (162 lb 9.6 oz)   SpO2 97%   BMI 22.05 kg/m    General: Well hydrated. No acute distress.  Abdomen: Soft, NT.  Perianal external examination:  Perianal skin: Intact with no excoriation or lichenification.  Lesions: No evidence of an external lesion, nodularity, or induration in the perianal region.    Digital rectal examination: Was performed.   Sphincter tone: Good.  Palpable lesions: No.    Anoscopy: Was performed.   Hemorrhoids: Yes. Left lateral enlarged, injected, consistent with recent bleeding. Right posterior moderately enlarged without signs of bleeding. Right anterior small.  Lesions: No    Procedures:  After discussing the risks and benefits, the patient agreed to proceed with internal hemorrhoidal banding.    Prior to the start of the procedure and with procedural staff participation, I verbally confirmed the patient s identity using two indicators, relevant allergies, that the procedure was appropriate and matched the consent, and that the correct equipment/implants were available. Immediately prior to starting the procedure I conducted the \"time out\" with the procedural staff and re-confirmed the patient s name, procedure, and site/side.    Sedation (Moderate or Deep): None    A suction hemorrhoidal  was used to place a total of 2 band(s) each in the left lateral and right posterior position(s).    There was no significant bleeding. The patient tolerated the procedure well.  .    Laboratory values reviewed:  Recent Labs   Lab Test 05/31/18  1801   WBC 5.0   HGB 14.8   *   CR 1.15   ALBUMIN 3.9   BILITOTAL 0.3   ALKPHOS 64   ALT 35   AST 34     ASSESSMENT  40M with bleeding grade 2 internal hemorrhoids LL>>RP>RA. Banding of LL and RP " performed in clinic today. Discussed continuing high fiber diet and miralax and minimizing time on the toilet as he is doing. Will have him return in ~4 weeks for recheck and possible repeat banding if needed.    PLAN  1. Follow up with me or Jesenia Das NP in 4 weeks  2. Continue high fiber diet and miralax    Time spent: 30 minutes.  >50% spent in discussing, counseling and coordinating care.    Jeff Charles M.D    Division of Colon and Rectal Surgery  [facility]    Referring Provider:  Alonzo Carlin MD  [address] [place] [id number]     Primary Care Provider:  Alonzo Carlin

## 2020-07-20 ENCOUNTER — TELEPHONE (OUTPATIENT)
Dept: NURSING | Facility: CLINIC | Age: 40
End: 2020-07-20

## 2020-07-20 NOTE — TELEPHONE ENCOUNTER
.[facility]: Nurse Triage Note  SITUATION/BACKGROUND                                                      Edmund Hernandez is a 40 year old male who calls with rectal pain post rectal hemorroidal banding 7/17/20. Patient following up after communicating concern via [employer].   Colorectal Surgery nurse Radha had responded via [employer] this morning  However, patient had not read response. Patient requested to speak with Radha for  further assistance.   This nurse contacted Surgery and call transferred to Radha for further assistance.

## 2020-07-21 ENCOUNTER — HOSPITAL ENCOUNTER (EMERGENCY)
Facility: CLINIC | Age: 40
Discharge: HOME | End: 2020-07-21
Attending: EMERGENCY MEDICINE | Admitting: EMERGENCY MEDICINE
Payer: COMMERCIAL

## 2020-07-21 VITALS
HEIGHT: 72 IN | RESPIRATION RATE: 15 BRPM | HEART RATE: 71 BPM | TEMPERATURE: 97.9 F | DIASTOLIC BLOOD PRESSURE: 91 MMHG | SYSTOLIC BLOOD PRESSURE: 133 MMHG | BODY MASS INDEX: 22.21 KG/M2 | WEIGHT: 164 LBS | OXYGEN SATURATION: 98 %

## 2020-07-21 DIAGNOSIS — K62.89 ANAL OR RECTAL PAIN: ICD-10-CM

## 2020-07-21 PROCEDURE — 99283 EMERGENCY DEPT VISIT LOW MDM: CPT | Mod: Z6 | Performed by: EMERGENCY MEDICINE

## 2020-07-21 PROCEDURE — 99282 EMERGENCY DEPT VISIT SF MDM: CPT | Performed by: EMERGENCY MEDICINE

## 2020-07-21 ASSESSMENT — ENCOUNTER SYMPTOMS
ROS GI COMMENTS: ANAL PAIN
HEADACHES: 0
COUGH: 0
NECK STIFFNESS: 0
COLOR CHANGE: 0
ARTHRALGIAS: 0
FEVER: 0
NAUSEA: 1
ADENOPATHY: 0
DYSURIA: 0
ABDOMINAL PAIN: 0
FREQUENCY: 0
VOMITING: 1
SHORTNESS OF BREATH: 0
CHILLS: 0
CONFUSION: 0
DIFFICULTY URINATING: 0
ANAL BLEEDING: 0
BRUISES/BLEEDS EASILY: 0
EYE REDNESS: 0
BLOOD IN STOOL: 0
LIGHT-HEADEDNESS: 0
POLYDIPSIA: 0

## 2020-07-21 ASSESSMENT — MIFFLIN-ST. JEOR: SCORE: 1681.9

## 2020-07-21 NOTE — ED TRIAGE NOTES
Pt arrives to triage with complaints of rectal pain. Pt had ligation of hemorrhoid on Friday and has been having intermittent intolerable pain. Pt was told by clinic to come to ER if he was unable to control his pain. Pain is worse with bowel movement. Pt was told there should be minimal pain after procedure. Pt taking tylenol every 4 hours with no relief. A&O, VSS

## 2020-07-21 NOTE — ED PROVIDER NOTES
[place] EMERGENCY DEPARTMENT ([facility])  2020  History     Chief Complaint   Patient presents with     Post-op Problem     The history is provided by the patient and medical records.     Edmund Hernandez is a 40 year old male with a past medical history significant for s/p banding of internal hemmorrhoids 2020 and IBS who presents to the Emergency Department for evaluation of post-op problem.    Patient reports worsening pain on his left sided hemorrhoid since 48 hours post surgery. He notes the pain is intermittent and it came in waves.  It is sharp, moderate to severe.  He reports pain was lessened when he would stand still, not allowing any blood into the area. Patient states taking tylenol every few hours but it did not help. Patient notes the pain has gotten better with time but is still very painful. He notes speaking with his surgeon who suggests the band may have moved and is possibly touching a nerve now. Patient denies fever, bleeding, discharge, but notes the left side feels very warm.     PAST MEDICAL HISTORY: History reviewed. No pertinent past medical history.    PAST SURGICAL HISTORY:   Past Surgical History:   Procedure Laterality Date     NO HISTORY OF SURGERY         Past medical history, past surgical history, medications, and allergies were reviewed with the patient. Additional pertinent items: None    FAMILY HISTORY:   Family History   Problem Relation Age of Onset     Alzheimer Disease Mother      Prostate Problems Father      Prostate Problems Maternal Half-Sister      Prostate Cancer Maternal Uncle          at age 80's     Prostate Cancer Maternal Uncle          at age 65+     Diabetes No family hx of        SOCIAL HISTORY:   Social History     Tobacco Use     Smoking status: Former Smoker     Packs/day: 0.00     Types: Cigarettes     Start date: 1996     Last attempt to quit: 2006     Years since quittin.5     Smokeless tobacco: Never Used    Substance Use Topics     Alcohol use: Yes     Alcohol/week: 1.0 - 3.0 standard drinks     Types: 1 - 3 Shots of liquor per week     Frequency: 2-4 times a month     Drinks per session: 1 or 2     Binge frequency: Monthly     Comment: few drinks once a month      Social history was reviewed with the patient. Additional pertinent items: None      Discharge Medication List as of 7/21/2020  2:01 PM      CONTINUE these medications which have NOT CHANGED    Details   polyethylene glycol (MIRALAX) powder Take 1 capful by mouth daily, Historical              No Known Allergies     Review of Systems   Constitutional: Negative for chills and fever.   HENT: Negative for congestion.    Eyes: Negative for redness.   Respiratory: Negative for cough and shortness of breath.    Cardiovascular: Negative for chest pain.   Gastrointestinal: Positive for nausea and vomiting ( resolved). Negative for abdominal pain, anal bleeding and blood in stool.        Anal pain   Endocrine: Negative for polydipsia and polyuria.   Genitourinary: Negative for difficulty urinating, dysuria and frequency.   Musculoskeletal: Negative for arthralgias and neck stiffness.   Skin: Negative for color change.   Allergic/Immunologic: Negative for immunocompromised state.   Neurological: Negative for light-headedness and headaches.   Hematological: Negative for adenopathy. Does not bruise/bleed easily.   Psychiatric/Behavioral: Negative for confusion.   All other systems reviewed and are negative.    A complete review of systems was performed with pertinent positives and negatives noted in the HPI, and all other systems negative.    Physical Exam   BP: 126/72  Pulse: 62  Temp: 97.7  F (36.5  C)  Resp: 16  Height: 182.9 cm (6')  Weight: 74.4 kg (164 lb)  SpO2: 97 %      Physical Exam  Constitutional:       General: He is not in acute distress.     Appearance: Normal appearance. He is not diaphoretic.   HENT:      Head: Normocephalic and atraumatic.      Nose:  Nose normal.   Eyes:      General: No scleral icterus.     Extraocular Movements: Extraocular movements intact.      Conjunctiva/sclera: Conjunctivae normal.   Cardiovascular:      Rate and Rhythm: Normal rate.      Pulses: Normal pulses.      Heart sounds: Normal heart sounds.   Pulmonary:      Effort: Pulmonary effort is normal. No respiratory distress.      Breath sounds: Normal breath sounds. No wheezing.   Abdominal:      General: Bowel sounds are normal.      Palpations: Abdomen is soft.      Tenderness: There is no abdominal tenderness. There is no guarding or rebound.   Genitourinary:     Comments: No evidence of anal bleeding or external hemorrhoids.  No evidence of perianal erythema, induration, fluctuance, or tenderness.  Digital rectal examination deferred for colorectal team.  Musculoskeletal: Normal range of motion.         General: No tenderness.   Skin:     General: Skin is warm.      Capillary Refill: Capillary refill takes less than 2 seconds.      Findings: No rash.   Neurological:      General: No focal deficit present.      Mental Status: He is alert and oriented to person, place, and time.      Coordination: Coordination normal.   Psychiatric:         Mood and Affect: Mood normal.         Behavior: Behavior normal.         Thought Content: Thought content normal.         Judgment: Judgment normal.         ED Course   12:08 PM  The patient was seen and examined by Dr. Campos in Room ED28.        Procedures                           No results found for this or any previous visit (from the past 24 hour(s)).  Medications - No data to display          Assessments & Plan (with Medical Decision Making)   40-year-old man presenting to the emergency department with anal pain after having internal hemorrhoids banded 4 days ago.  Differential diagnosis: Postop complication, nerve impingement, perirectal abscess, unlikely perforation.    After thorough history and physical exam patient appears to be no  acute distress.  Currently he denied pain and he declined pain medications.  He was referred to the emergency department by colorectal surgery team; they were consulted and they will see him for further input and recommendations.    Patient was seen and evaluated by colorectal surgery resident and fellow.  They feel that 1 of his internal hemorrhoid bands is likely to close to the dentate line.  Advised against any manipulation at this time given that it would likely cause significant bleeding.  Patient agreed with this plan.  He agreed to be discharged with addition of Tylenol to his ibuprofen pain regimen.  He will follow-up with colorectal surgery clinic at his scheduled appointment and return to the emergency department if he develops fever or worsening symptoms.  At this time he is stable for discharge.    I have reviewed the nursing notes.    I have reviewed the findings, diagnosis, plan and need for follow up with the patient.    Discharge Medication List as of 7/21/2020  2:01 PM          Final diagnoses:   Anal or rectal pain     Ramiro BEAL, am serving as a trained medical scribe to document services personally performed by Austin Campos MD, MD, based on the provider's statements to me.     Beth BEAL Nikola, MD, MD, was physically present and have reviewed and verified the accuracy of this note documented by Ramiro Clemens.    7/21/2020   [facility], [place], EMERGENCY DEPARTMENT     Austin Campos MD  07/21/20 8572

## 2020-07-21 NOTE — CONSULTS
Colon and Rectal Surgery Consultation Note  [facility]    Edmund Hernandez MRN# [id number]   Age: 40 year old YOB: 1980     Date of Admission:  7/21/2020    Reason for consult: Post hemorrhoid banding pain       Requesting physician: Beth       Level of consult: One-time consult to assist in determining a diagnosis, recommend an appropriate treatment plan and place orders           Assessment:   41 y/o m who presents to ED after talking to clinic nurse for post hemorrhoid banding pain. He had 2 internal hemorrhoids banded on Friday 7/17/20. Pt is currently stable. Denies any trouble with urination. Mentions pain is worse with movement and defecation. Pain was worst on Saturday but has progressively gotten better. Pain possibly due to banding near dentate line. No concern for acute infectious process at this time.         Recommendations:   - Have patient continue sitz baths as needed  - Add Advil 600mg Q6PRN or 800mg Q8PRN for pain  - Follow up in clinic at scheduled appointment          History of Present Illness:   CC: Pain in area of hemorrhoid banding     History is obtained from the patient    41 y/o m who presents to ED after talking to clinic nurse for post hemorrhoid banding pain. He had 2 internal hemorrhoids (1 hemorrhoid on left Posterior lateral side, 1 hemorrhoid on Right lateral side) banded on Friday 7/17/20. Pt is currently stable. Denies any trouble with urination. Mentions pain is worse with movement and defecation. Has gotten progressively worse since Saturday. Says sitz baths and cold help the pain but do not eliminate it. Also mentions his tylenol has not helped. Had 1 episode of emesis on Saturday which he attributed to the pain but has not had any since. Denies any SOB, Fevers, Chills. Denies any abdominal pain.          Past Medical History:   History reviewed. No pertinent past medical history.          Past Surgical History:     Past Surgical History:    Procedure Laterality Date     NO HISTORY OF SURGERY               Social History:     Social History     Socioeconomic History     Marital status:      Spouse name: Not on file     Number of children: 1     Years of education: Not on file     Highest education level: Not on file   Occupational History     Occupation:      Comment: biomedical research   Social Needs     Financial resource strain: Not on file     Food insecurity     Worry: Not on file     Inability: Not on file     Transportation needs     Medical: Not on file     Non-medical: Not on file   Tobacco Use     Smoking status: Former Smoker     Packs/day: 0.00     Types: Cigarettes     Start date: 1996     Last attempt to quit: 2006     Years since quittin.5     Smokeless tobacco: Never Used   Substance and Sexual Activity     Alcohol use: Yes     Alcohol/week: 1.0 - 3.0 standard drinks     Types: 1 - 3 Shots of liquor per week     Frequency: 2-4 times a month     Drinks per session: 1 or 2     Binge frequency: Monthly     Comment: few drinks once a month      Drug use: No     Sexual activity: Yes     Partners: Female     Birth control/protection: Pill   Lifestyle     Physical activity     Days per week: Not on file     Minutes per session: Not on file     Stress: Not on file   Relationships     Social connections     Talks on phone: Not on file     Gets together: Not on file     Attends Sabianism service: Not on file     Active member of club or organization: Not on file     Attends meetings of clubs or organizations: Not on file     Relationship status: Not on file     Intimate partner violence     Fear of current or ex partner: Not on file     Emotionally abused: Not on file     Physically abused: Not on file     Forced sexual activity: Not on file   Other Topics Concern     Parent/sibling w/ CABG, MI or angioplasty before 65F 55M? No   Social History Narrative    Live with spouse and one daughter.  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  Originally from [place].  Moved to [place] at age 14.             Family History:     Family History   Problem Relation Age of Onset     Alzheimer Disease Mother      Prostate Problems Father      Prostate Problems Maternal Half-Sister      Prostate Cancer Maternal Uncle          at age 80's     Prostate Cancer Maternal Uncle          at age 65+     Diabetes No family hx of              Allergies:    No Known Allergies          Medications:   No current facility-administered medications on file prior to encounter.   polyethylene glycol (MIRALAX) powder, Take 1 capful by mouth daily              Review of Systems:      All other review of systems negative, except for what is mentioned above        Physical Exam:   /72   Pulse 62   Temp 97.7  F (36.5  C) (Oral)   Resp 16   Ht 1.829 m (6')   Wt 74.4 kg (164 lb)   SpO2 97%   BMI 22.24 kg/m    General: Alert, interactive, NAD  Resp: CTAB  Cardiac: RRR  Abdomen: Soft, nontender, nondistended, no rebound or guarding. No erythema or edema in perianal skin.   Extremities: No LE edema or obvious joint abnormalities  Skin: Warm and dry, no jaundice or rash  Neuro: A&Ox3, CN 2-12 intact, ZAMARRIPA            Data:         Servando Macdonald MD  PGY1 Colon and Rectal

## 2020-07-21 NOTE — ED AVS SNAPSHOT
[facility], [place], Emergency Department  [address] [place] [id number]  Phone:  [phone]                                    Edmund Hernandez   MRN: [id number]    Department:  [facility], Emergency Department   Date of Visit:  7/21/2020           After Visit Summary Signature Page    I have received my discharge instructions, and my questions have been answered. I have discussed any challenges I see with this plan with the nurse or doctor.    ..........................................................................................................................................  Patient/Patient Representative Signature      ..........................................................................................................................................  Patient Representative Print Name and Relationship to Patient    ..................................................               ................................................  Date                                   Time    ..........................................................................................................................................  Reviewed by Signature/Title    ...................................................              ..............................................  Date                                               Time          22EPIC Rev 08/18

## 2020-07-21 NOTE — DISCHARGE INSTRUCTIONS
Please follow up with [facility] (phone: [phone]) at your scheduled appointment.  Please take Tylenol in addition to ibuprofen for pain, if needed.  If your symptoms worsen or you develop a fever please come back to the emergency department.    You were seen today due to anal/rectal pain after internal hemorrhoid banding that was done in colorectal surgery clinic several days ago.  Colorectal surgery team has seen you and evaluated you in the emergency department and they felt that no additional interventions or procedures are needed at this time.  Please call the clinic with any additional questions.

## 2020-10-30 ENCOUNTER — IMMUNIZATION (OUTPATIENT)
Dept: NURSING | Facility: CLINIC | Age: 40
End: 2020-10-30
Payer: COMMERCIAL

## 2020-10-30 PROCEDURE — 90471 IMMUNIZATION ADMIN: CPT

## 2020-10-30 PROCEDURE — 90686 IIV4 VACC NO PRSV 0.5 ML IM: CPT

## 2021-04-07 ENCOUNTER — IMMUNIZATION (OUTPATIENT)
Dept: NURSING | Facility: CLINIC | Age: 41
End: 2021-04-07
Payer: COMMERCIAL

## 2021-04-07 PROCEDURE — 0001A PR COVID VAC PFIZER DIL RECON 30 MCG/0.3 ML IM: CPT

## 2021-04-07 PROCEDURE — 91300 PR COVID VAC PFIZER DIL RECON 30 MCG/0.3 ML IM: CPT

## 2021-04-16 ENCOUNTER — MYC MEDICAL ADVICE (OUTPATIENT)
Dept: FAMILY MEDICINE | Facility: CLINIC | Age: 41
End: 2021-04-16

## 2021-04-19 ENCOUNTER — NURSE TRIAGE (OUTPATIENT)
Dept: FAMILY MEDICINE | Facility: CLINIC | Age: 41
End: 2021-04-19

## 2021-04-19 NOTE — TELEPHONE ENCOUNTER
Left  for patient to return call to clinic regarding my chart message and symptoms he is having    Alda Varela RN   [facility]

## 2021-04-25 ENCOUNTER — HEALTH MAINTENANCE LETTER (OUTPATIENT)
Age: 41
End: 2021-04-25

## 2021-04-28 ENCOUNTER — IMMUNIZATION (OUTPATIENT)
Dept: NURSING | Facility: CLINIC | Age: 41
End: 2021-04-28
Attending: INTERNAL MEDICINE
Payer: COMMERCIAL

## 2021-04-28 PROCEDURE — 0002A PR COVID VAC PFIZER DIL RECON 30 MCG/0.3 ML IM: CPT

## 2021-04-28 PROCEDURE — 91300 PR COVID VAC PFIZER DIL RECON 30 MCG/0.3 ML IM: CPT

## 2021-10-10 ENCOUNTER — HEALTH MAINTENANCE LETTER (OUTPATIENT)
Age: 41
End: 2021-10-10

## 2022-01-31 ENCOUNTER — OFFICE VISIT (OUTPATIENT)
Dept: FAMILY MEDICINE | Facility: CLINIC | Age: 42
End: 2022-01-31
Payer: COMMERCIAL

## 2022-01-31 VITALS
HEIGHT: 72 IN | DIASTOLIC BLOOD PRESSURE: 66 MMHG | TEMPERATURE: 97.7 F | BODY MASS INDEX: 22.21 KG/M2 | OXYGEN SATURATION: 100 % | SYSTOLIC BLOOD PRESSURE: 108 MMHG | WEIGHT: 164 LBS | HEART RATE: 78 BPM

## 2022-01-31 DIAGNOSIS — R53.82 CHRONIC FATIGUE: ICD-10-CM

## 2022-01-31 DIAGNOSIS — Z11.4 SCREENING FOR HIV (HUMAN IMMUNODEFICIENCY VIRUS): ICD-10-CM

## 2022-01-31 DIAGNOSIS — Z13.220 ENCOUNTER FOR LIPID SCREENING FOR CARDIOVASCULAR DISEASE: ICD-10-CM

## 2022-01-31 DIAGNOSIS — K64.9 HEMORRHOIDS, UNSPECIFIED HEMORRHOID TYPE: ICD-10-CM

## 2022-01-31 DIAGNOSIS — K58.1 IRRITABLE BOWEL SYNDROME WITH CONSTIPATION: ICD-10-CM

## 2022-01-31 DIAGNOSIS — Z11.59 NEED FOR HEPATITIS C SCREENING TEST: ICD-10-CM

## 2022-01-31 DIAGNOSIS — D62 ANEMIA DUE TO BLOOD LOSS, ACUTE: Primary | ICD-10-CM

## 2022-01-31 DIAGNOSIS — Z13.6 ENCOUNTER FOR LIPID SCREENING FOR CARDIOVASCULAR DISEASE: ICD-10-CM

## 2022-01-31 DIAGNOSIS — Z00.00 ROUTINE GENERAL MEDICAL EXAMINATION AT A HEALTH CARE FACILITY: Primary | ICD-10-CM

## 2022-01-31 LAB
ALBUMIN SERPL-MCNC: 3.8 G/DL (ref 3.4–5)
ALP SERPL-CCNC: 46 U/L (ref 40–150)
ALT SERPL W P-5'-P-CCNC: 37 U/L (ref 0–70)
ANION GAP SERPL CALCULATED.3IONS-SCNC: 5 MMOL/L (ref 3–14)
AST SERPL W P-5'-P-CCNC: 21 U/L (ref 0–45)
BASOPHILS # BLD AUTO: 0 10E3/UL (ref 0–0.2)
BASOPHILS NFR BLD AUTO: 0 %
BILIRUB SERPL-MCNC: 0.2 MG/DL (ref 0.2–1.3)
BUN SERPL-MCNC: 16 MG/DL (ref 7–30)
CALCIUM SERPL-MCNC: 8.8 MG/DL (ref 8.5–10.1)
CHLORIDE BLD-SCNC: 106 MMOL/L (ref 94–109)
CHOLEST SERPL-MCNC: 154 MG/DL
CO2 SERPL-SCNC: 26 MMOL/L (ref 20–32)
CREAT SERPL-MCNC: 1.06 MG/DL (ref 0.66–1.25)
EOSINOPHIL # BLD AUTO: 0.2 10E3/UL (ref 0–0.7)
EOSINOPHIL NFR BLD AUTO: 5 %
ERYTHROCYTE [DISTWIDTH] IN BLOOD BY AUTOMATED COUNT: 16.5 % (ref 10–15)
FASTING STATUS PATIENT QL REPORTED: YES
GFR SERPL CREATININE-BSD FRML MDRD: 89 ML/MIN/1.73M2
GLUCOSE BLD-MCNC: 96 MG/DL (ref 70–99)
HCT VFR BLD AUTO: 27.2 % (ref 40–53)
HCV AB SERPL QL IA: NONREACTIVE
HDLC SERPL-MCNC: 39 MG/DL
HGB BLD-MCNC: 7.8 G/DL (ref 13.3–17.7)
HIV 1+2 AB+HIV1 P24 AG SERPL QL IA: NONREACTIVE
IRON SATN MFR SERPL: 2 % (ref 15–46)
IRON SERPL-MCNC: 11 UG/DL (ref 35–180)
LDLC SERPL CALC-MCNC: 90 MG/DL
LYMPHOCYTES # BLD AUTO: 1.5 10E3/UL (ref 0.8–5.3)
LYMPHOCYTES NFR BLD AUTO: 30 %
MCH RBC QN AUTO: 21.1 PG (ref 26.5–33)
MCHC RBC AUTO-ENTMCNC: 28.7 G/DL (ref 31.5–36.5)
MCV RBC AUTO: 74 FL (ref 78–100)
MONOCYTES # BLD AUTO: 0.5 10E3/UL (ref 0–1.3)
MONOCYTES NFR BLD AUTO: 9 %
NEUTROPHILS # BLD AUTO: 2.9 10E3/UL (ref 1.6–8.3)
NEUTROPHILS NFR BLD AUTO: 56 %
NONHDLC SERPL-MCNC: 115 MG/DL
PLATELET # BLD AUTO: 208 10E3/UL (ref 150–450)
POTASSIUM BLD-SCNC: 4 MMOL/L (ref 3.4–5.3)
PROT SERPL-MCNC: 7.5 G/DL (ref 6.8–8.8)
RBC # BLD AUTO: 3.69 10E6/UL (ref 4.4–5.9)
SODIUM SERPL-SCNC: 137 MMOL/L (ref 133–144)
TIBC SERPL-MCNC: 461 UG/DL (ref 240–430)
TRIGL SERPL-MCNC: 125 MG/DL
TSH SERPL DL<=0.005 MIU/L-ACNC: 1.51 MU/L (ref 0.4–4)
WBC # BLD AUTO: 5.1 10E3/UL (ref 4–11)

## 2022-01-31 PROCEDURE — 86803 HEPATITIS C AB TEST: CPT | Performed by: FAMILY MEDICINE

## 2022-01-31 PROCEDURE — 36415 COLL VENOUS BLD VENIPUNCTURE: CPT | Performed by: FAMILY MEDICINE

## 2022-01-31 PROCEDURE — 80061 LIPID PANEL: CPT | Performed by: FAMILY MEDICINE

## 2022-01-31 PROCEDURE — 99396 PREV VISIT EST AGE 40-64: CPT | Performed by: FAMILY MEDICINE

## 2022-01-31 PROCEDURE — 99214 OFFICE O/P EST MOD 30 MIN: CPT | Mod: 25 | Performed by: FAMILY MEDICINE

## 2022-01-31 PROCEDURE — 83550 IRON BINDING TEST: CPT | Performed by: FAMILY MEDICINE

## 2022-01-31 PROCEDURE — 80050 GENERAL HEALTH PANEL: CPT | Performed by: FAMILY MEDICINE

## 2022-01-31 PROCEDURE — 87389 HIV-1 AG W/HIV-1&-2 AB AG IA: CPT | Performed by: FAMILY MEDICINE

## 2022-01-31 ASSESSMENT — ENCOUNTER SYMPTOMS
HEMATOCHEZIA: 1
SHORTNESS OF BREATH: 1
DIZZINESS: 1

## 2022-01-31 ASSESSMENT — MIFFLIN-ST. JEOR: SCORE: 1670.15

## 2022-01-31 NOTE — PATIENT INSTRUCTIONS
Preventive Health Recommendations  Male Ages 40 to 49    Yearly exam:             See your health care provider every year in order to  o   Review health changes.   o   Discuss preventive care.    o   Review your medicines if your doctor has prescribed any.    You should be tested each year for STDs (sexually transmitted diseases) if you re at risk.     Have a cholesterol test every 5 years.     Have a colonoscopy (test for colon cancer) if someone in your family has had colon cancer or polyps before age 50.     After age 45, have a diabetes test (fasting glucose). If you are at risk for diabetes, you should have this test every 3 years.      Talk with your health care provider about whether or not a prostate cancer screening test (PSA) is right for you.    Shots: Get a flu shot each year. Get a tetanus shot every 10 years.     Nutrition:    Eat at least 5 servings of fruits and vegetables daily.     Eat whole-grain bread, whole-wheat pasta and brown rice instead of white grains and rice.     Get adequate Calcium and Vitamin D.     Lifestyle    Exercise for at least 150 minutes a week (30 minutes a day, 5 days a week). This will help you control your weight and prevent disease.     Limit alcohol to one drink per day.     No smoking.     Wear sunscreen to prevent skin cancer.     See your dentist every six months for an exam and cleaning.      Patient Education     Treating Hemorrhoids: Self-Care  Follow your healthcare provider s advice about caring for your hemorrhoids at home. Some treatments help relieve symptoms right away. Others involve making changes in your diet and exercise habits. These can help ease constipation and prevent hemorrhoids from coming back.   Relieving symptoms  Your healthcare provider may prescribe anti-inflammatory medicine to help ease your symptoms. These tips will also help relieve pain and swelling.     Take sitz baths. Taking a sitz bath means sitting in a few inches of warm bath  water. Soaking for 10 minutes twice a day can provide relief from painful hemorrhoids. It can also help the area stay clean.    Develop good bowel habits. Use the bathroom when you need to. Don t ignore the urge to move your bowels. This can lead to constipation, hard stools, and straining. Also, don t read while on the toilet. Sit only as long as needed. Wipe gently with soft, unscented toilet tissue or baby wipes.    Use ice packs. Placing an ice pack on an external hemorrhoid can help relieve pain right away. It will also help reduce the blood clot. Use the ice for 15 to 20 minutes at a time. Keep a cloth between the ice and your skin to prevent skin damage.    Use other measures. Laxatives and enemas can help ease constipation. But use them only on your healthcare provider s advice. For symptom relief, try using cotton pads soaked in witch hazel. These are available at most drugstores. Over-the-counter hemorrhoid ointments and petroleum jelly can also provide relief.  Add fiber to your diet    Adding fiber to your diet can help relieve constipation by making stools softer and easier to pass. To increase your fiber intake, your healthcare provider may recommend a bulking agent, such as psyllium. This is a high-fiber supplement available at most grocery stores and drugstores. Eating more fiber-rich foods will also help. There are two types of fiber:     Insoluble fiber is the main ingredient in bulking agents. It s also found in foods such as wheat bran, whole-grain breads, fresh fruits, and vegetables.    Soluble fiber is found in foods such as oat bran. Although soluble fiber is good for you, it may not ease constipation as much as foods high in insoluble fiber.  Drink more water  Along with a high-fiber diet, drinking more water can help ease constipation. This is because insoluble fiber absorbs water, making stools soft and bulky. Be sure to drink plenty of water throughout the day. Drinking fruit juices, such  as prune juice or apple juice, can also help prevent constipation.   Get more exercise  Regular exercise aids digestion and helps prevent constipation. It s also great for your health. So talk with your healthcare provider about starting an exercise program. Low-impact activities, such as swimming or walking, are good places to start. Take it easy at first. And remember to drink plenty of water when you exercise.   High-fiber foods Easy ways to add fiber  High-fiber foods offer many benefits. By making your stools softer, they help heal and prevent swollen hemorrhoids. They may also help reduce the risk of colon and rectal cancer. Best of all, they re usually low in calories and taste great. Here are some examples of fiber-rich foods.     Whole grains, such as wheat bran, corn bran, and brown rice    Vegetables, especially carrots, broccoli, cabbage, and peas    Fruits, such as apples, bananas, raisins, peaches, prunes, and pears    Nuts and legumes, especially peanuts, lentils, and kidney beans  Easy ways to add fiber  The tips below offer some simple ways to add more high-fiber foods to your meals.     Start your day with a high-fiber breakfast. Eat a wheat bran cereal along with a sliced banana. Or, try peanut butter on whole-wheat toast.    Eat carrot sticks for snacks. They re easy to prepare, taste great, and are low in calories.    Use whole-grain breads instead of white bread for sandwiches.    Eat fruits for treats. Try an apple and some raisins instead of a candy bar.  [employer] last reviewed this educational content on 6/1/2019 2000-2021 [employer]. All rights reserved. This information is not intended as a substitute for professional medical care. Always follow your healthcare professional's instructions.           Patient Education     Understanding Hemorrhoids  Hemorrhoid tissues are  cushions  of blood vessels that swell slightly during bowel movements. Too much pressure on the anal  canal can make these tissues stay enlarged. They may become inflamed and cause symptoms. This can happen both inside and outside the anal canal.     Parts of the anal canal  The parts of the anal canal are:     Internal hemorrhoid tissue is in the upper area of the anal canal.    The rectum is the last several inches of the colon. This is where stool is stored prior to bowel movements.    Anal sphincters are ring-shaped muscles that expand and contract to control the anal opening.    External hemorrhoid tissue lies under the anal skin.    The anus is the passage between the rectum and the outside of the body.  Normal hemorrhoid tissue  Hemorrhoid tissues play a vital role in helping your body get rid of waste. Food passes from the stomach through the intestines. The waste (stool) then travels through the colon to the rectum. It is stored in the rectum until it s ready to be passed from the anus. During bowel movements, hemorrhoids swell with blood and become slightly larger. This swelling helps protect and cushion the anal canal as stool passes from the body. Once the stool has passed, the tissues stop swelling and go back to normal.   Problem hemorrhoids  Pressure due to straining or other factors can cause hemorrhoid tissues to stay swollen. When this happens to the hemorrhoid tissues in the anal canal, they re called internal hemorrhoids. Swollen tissues around the anal opening are called external hemorrhoids. Depending on the location, your symptoms can differ.       Internal hemorrhoids often happen in clusters around the wall of the anal canal. They are often painless. But they may prolapse (protrude out of the anus) due to straining or pressure from hard stool. After the bowel movement is over, they may then reduce (go back inside the body). Internal hemorrhoids often bleed. They can also discharge mucus.    External hemorrhoids are located at the anal opening, just beneath the skin. These tissues rarely  cause problems unless they thrombose (form a blood clot). When this happens, a hard, bluish lump may appear. A thrombosed hemorrhoid also causes sudden, severe pain. In time, the clot may go away on its own. This sometimes leaves a  skin tag  of tissue stretched by the clot.  Hemorrhoid symptoms  Hemorrhoid symptoms may include:     Pain or a burning sensation    Bleeding during bowel movements    Protrusion of tissue from the anus    Feeling of blockage or fullness in the rectum during bowel movements    Itching around the anus    Mucus discharge from the anus  Causes of hemorrhoids  There s no single cause of hemorrhoids. Most often, though, they are caused by too much pressure on the anal canal. This can be due to:     Chronic (ongoing) constipation    Straining during bowel movements    Sitting too long on the toilet    Strenuous exercise or heavy lifting    Pregnancy and childbirth    Aging    Diarrhea  [employer] last reviewed this educational content on 4/1/2019 2000-2021 [employer]. All rights reserved. This information is not intended as a substitute for professional medical care. Always follow your healthcare professional's instructions.           Healthy Lifestyle   Nutrition and healthy eating: The Mediterranean Diet  Ready to switch to a more heart-healthy diet? Here's how to get started with the Mediterranean diet.  By [facility] Staff   If you're looking for a heart-healthy eating plan, the Mediterranean diet might be right for you.  The Mediterranean diet blends the basics of healthy eating with the traditional flavors and cooking methods of the [place].  Interest in the Mediterranean diet began in the 1960s with the observation that coronary heart disease caused fewer deaths in Mediterranean countries, such as [place] and [place], than in the [place] and [place]. Subsequent studies found that the Mediterranean diet is associated with reduced risk factors for cardiovascular  "disease.  The Mediterranean diet is one of the healthy eating plans recommended by the Dietary Guidelines for Americans to promote health and prevent chronic disease.  It is also recognized by the World Health Organization as a healthy and sustainable dietary pattern and as an intangible cultural asset by the United National Educational, Scientific and Cultural Organization.  The Mediterranean diet is a way of eating based on the traditional cuisine of countries bordering the [place] Sea. While there is no single definition of the Mediterranean diet, it is typically high in vegetables, fruits, whole grains, beans, nut and seeds, and olive oil.  The main components of Mediterranean diet include:    Daily consumption of vegetables, fruits, whole grains and healthy fats     Weekly intake of fish, poultry, beans and eggs     Moderate portions of dairy products     Limited intake of red meat  Other important elements of the Mediterranean diet are sharing meals with family and friends, enjoying a glass of red wine and being physically active.  The foundation of the Mediterranean diet is vegetables, fruits, herbs, nuts, beans and whole grains. Meals are built around these plant-based foods. Moderate amounts of dairy, poultry and eggs are also central to the Mediterranean Diet, as is seafood. In contrast, red meat is eaten only occasionally.  Healthy fats are a mainstay of the Mediterranean diet. They're eaten instead of less healthy fats, such as saturated and trans fats, which contribute to heart disease.  Olive oil is the primary source of added fat in the Mediterranean diet. Olive oil provides monounsaturated fat, which has been found to lower total cholesterol and low-density lipoprotein (LDL or \"bad\") cholesterol levels. Nuts and seeds also contain monounsaturated fat.  Fish are also important in the Mediterranean diet. Fatty fish -- such as mackerel, herring, sardines, albacore tuna, salmon and lake trout -- " are rich in omega-3 fatty acids, a type of polyunsaturated fat that may reduce inflammation in the body. Omega-3 fatty acids also help decrease triglycerides, reduce blood clotting, and decrease the risk of stroke and heart failure.  The Mediterranean diet typically allows red wine in moderation. Although alcohol has been associated with a reduced risk of heart disease in some studies, it's by no means risk free. The Dietary Guidelines for Americans caution against beginning to drink or drinking more often on the basis of potential health benefits.  Interested in trying the Mediterranean diet? These tips will help you get started:    Eat more fruits and vegetables. Aim for 7 to 10 servings a day of fruit and vegetables.     Opt for whole grains. Switch to whole-grain bread, cereal and pasta. [place] with other whole grains, such as bulgur and farro.     Use healthy fats. Try olive oil as a replacement for butter when cooking. Instead of putting butter or margarine on bread, try dipping it in flavored olive oil.     Eat more seafood. Eat fish twice a week. Fresh or water-packed tuna, salmon, trout, mackerel and herring are healthy choices. Grilled fish tastes good and requires little cleanup. Avoid deep-fried fish.     Reduce red meat. Substitute fish, poultry or beans for meat. If you eat meat, make sure it's lean and keep portions small.     Enjoy some dairy. Eat low-fat Greek or plain yogurt and small amounts of a variety of cheeses.     Spice it up. Herbs and spices boost flavor and lessen the need for salt.  The Mediterranean diet is a delicious and healthy way to eat. Many people who switch to this style of eating say they'll never eat any other way.

## 2022-01-31 NOTE — PROGRESS NOTES
Hemoglobin ordered to be done on 2 days  Ferrous sulfate daily, low frequency to avoid constipation and symptom exacerbation  Reassuring that patient is feeling better w/o bleeding.    Alonzo Anguiano MD

## 2022-01-31 NOTE — PROGRESS NOTES
SUBJECTIVE:   CC: Edmund Hernandez is an 41 year old male who presents for preventative health visit.   Patient has been advised of split billing requirements and indicates understanding: Yes  Healthy Habits:     Getting at least 3 servings of Calcium per day:  Yes    Bi-annual eye exam:  Yes    Dental care twice a year:  NO    Sleep apnea or symptoms of sleep apnea:  None    Diet:  Other    Frequency of exercise:  2-3 days/week    Duration of exercise:  15-30 minutes    Taking medications regularly:  Yes    Medication side effects:  Not applicable    PHQ-2 Total Score: 0    Additional concerns today:  Yes (Feeling fatigued as sick over a month ago and gets out of breath easier )    Booster shot new years dima  Was sick before he received the booster, then received the booster  Has had easy fatigability since then when walking, going up stairs  Had cough/headache  Family was sick as well    Elevated cholesterol    Hemorrhoids  History of IBS  Tends to bleed a lot  And gets dizzy  Last episode was about 10 days ago          Today's PHQ-2 Score:   PHQ-2 ( 1999 Pfizer) 1/31/2022   Q1: Little interest or pleasure in doing things 0   Q2: Feeling down, depressed or hopeless 0   PHQ-2 Score 0   PHQ-2 Total Score (12-17 Years)- Positive if 3 or more points; Administer PHQ-A if positive -   Q1: Little interest or pleasure in doing things Not at all   Q2: Feeling down, depressed or hopeless Not at all   PHQ-2 Score 0       Abuse: Current or Past(Physical, Sexual or Emotional)- No  Do you feel safe in your environment? Yes    Have you ever done Advance Care Planning? (For example, a Health Directive, POLST, or a discussion with a medical provider or your loved ones about your wishes): No, advance care planning information given to patient to review.  Patient declined advance care planning discussion at this time.    Social History     Tobacco Use     Smoking status: Former Smoker     Packs/day: 0.00     Types: Cigarettes      "Start date: 1996     Quit date: 2006     Years since quittin.0     Smokeless tobacco: Never Used   Substance Use Topics     Alcohol use: Yes     Alcohol/week: 1.0 - 3.0 standard drink     Types: 1 - 3 Shots of liquor per week     Comment: few drinks once a month      If you drink alcohol do you typically have >3 drinks per day or >7 drinks per week? No    Alcohol Use 2022   Prescreen: >3 drinks/day or >7 drinks/week? No   Prescreen: >3 drinks/day or >7 drinks/week? -   No flowsheet data found.    Last PSA: No results found for: PSA    Reviewed orders with patient. Reviewed health maintenance and updated orders accordingly - Yes  BP Readings from Last 3 Encounters:   22 108/66   20 (!) 133/91   20 120/74    Wt Readings from Last 3 Encounters:   22 74.4 kg (164 lb)   20 74.4 kg (164 lb)   20 73.8 kg (162 lb 9.6 oz)                    Reviewed and updated as needed this visit by clinical staff  Tobacco  Allergies  Meds             Reviewed and updated as needed this visit by Provider                   Review of Systems   Respiratory: Positive for shortness of breath.    Gastrointestinal: Positive for hematochezia.   Neurological: Positive for dizziness.         OBJECTIVE:   /66   Pulse 78   Temp 97.7  F (36.5  C) (Oral)   Ht 1.818 m (5' 11.58\")   Wt 74.4 kg (164 lb)   SpO2 100%   BMI 22.51 kg/m      Physical Exam  GENERAL: healthy, alert and no distress  EYES: Eyes grossly normal to inspection, PERRL and conjunctivae and sclerae normal  HENT: ear canals and TM's normal, nose and mouth without ulcers or lesions  NECK: no adenopathy, no asymmetry, masses, or scars and thyroid normal to palpation  RESP: lungs clear to auscultation - no rales, rhonchi or wheezes  CV: regular rate and rhythm, normal S1 S2, no S3 or S4, no murmur, click or rub, no peripheral edema and peripheral pulses strong  ABDOMEN: soft, nontender, no hepatosplenomegaly, no masses and " "bowel sounds normal  MS: no gross musculoskeletal defects noted, no edema  SKIN: no suspicious lesions or rashes  NEURO: Normal strength and tone, mentation intact and speech normal  PSYCH: mentation appears normal, affect normal/bright        ASSESSMENT/PLAN:       ICD-10-CM    1. Routine general medical examination at a health care facility  Z00.00 REVIEW OF HEALTH MAINTENANCE PROTOCOL ORDERS     HIV Antigen Antibody Combo     Hepatitis C Screen Reflex to HCV RNA Quant and Genotype     Lipid panel reflex to direct LDL Fasting     Comprehensive metabolic panel     CBC with Platelets & Differential   2. Chronic fatigue  R53.82 TSH with free T4 reflex   3. Encounter for lipid screening for cardiovascular disease  Z13.220 Lipid panel reflex to direct LDL Fasting    Z13.6    4. Hemorrhoids, unspecified hemorrhoid type  K64.9 Iron & Iron Binding Capacity   5. Irritable bowel syndrome with constipation  K58.1    6. Screening for HIV (human immunodeficiency virus)  Z11.4    7. Need for hepatitis C screening test  Z11.59 Hepatitis C Screen Reflex to HCV RNA Quant and Genotype       Patient has been advised of split billing requirements and indicates understanding: Yes    COUNSELING:   Reviewed preventive health counseling, as reflected in patient instructions       Regular exercise       Healthy diet/nutrition    Estimated body mass index is 22.51 kg/m  as calculated from the following:    Height as of this encounter: 1.818 m (5' 11.58\").    Weight as of this encounter: 74.4 kg (164 lb).         He reports that he quit smoking about 16 years ago. His smoking use included cigarettes. He started smoking about 26 years ago. He smoked 0.00 packs per day. He has never used smokeless tobacco.      Counseling Resources:  ATP IV Guidelines  Pooled Cohorts Equation Calculator  FRAX Risk Assessment  ICSI Preventive Guidelines  Dietary Guidelines for Americans, 2010  USDA's MyPlate  ASA Prophylaxis  Lung CA Screening    Alonzo " MD Annmarie  [facility]

## 2022-02-02 ENCOUNTER — LAB (OUTPATIENT)
Dept: LAB | Facility: CLINIC | Age: 42
End: 2022-02-02
Payer: COMMERCIAL

## 2022-02-02 ENCOUNTER — TELEPHONE (OUTPATIENT)
Dept: FAMILY MEDICINE | Facility: CLINIC | Age: 42
End: 2022-02-02

## 2022-02-02 DIAGNOSIS — D64.9 ANEMIA, UNSPECIFIED TYPE: Primary | ICD-10-CM

## 2022-02-02 DIAGNOSIS — D62 ANEMIA DUE TO BLOOD LOSS, ACUTE: ICD-10-CM

## 2022-02-02 LAB
BASOPHILS # BLD AUTO: 0 10E3/UL (ref 0–0.2)
BASOPHILS NFR BLD AUTO: 1 %
EOSINOPHIL # BLD AUTO: 0.2 10E3/UL (ref 0–0.7)
EOSINOPHIL NFR BLD AUTO: 4 %
ERYTHROCYTE [DISTWIDTH] IN BLOOD BY AUTOMATED COUNT: 16.3 % (ref 10–15)
HCT VFR BLD AUTO: 25.6 % (ref 40–53)
HGB BLD-MCNC: 7.5 G/DL (ref 13.3–17.7)
LYMPHOCYTES # BLD AUTO: 1.5 10E3/UL (ref 0.8–5.3)
LYMPHOCYTES NFR BLD AUTO: 35 %
MCH RBC QN AUTO: 21.4 PG (ref 26.5–33)
MCHC RBC AUTO-ENTMCNC: 29.3 G/DL (ref 31.5–36.5)
MCV RBC AUTO: 73 FL (ref 78–100)
MONOCYTES # BLD AUTO: 0.4 10E3/UL (ref 0–1.3)
MONOCYTES NFR BLD AUTO: 10 %
NEUTROPHILS # BLD AUTO: 2.2 10E3/UL (ref 1.6–8.3)
NEUTROPHILS NFR BLD AUTO: 51 %
PLATELET # BLD AUTO: 209 10E3/UL (ref 150–450)
RBC # BLD AUTO: 3.5 10E6/UL (ref 4.4–5.9)
WBC # BLD AUTO: 4.2 10E3/UL (ref 4–11)

## 2022-02-02 PROCEDURE — 85025 COMPLETE CBC W/AUTO DIFF WBC: CPT

## 2022-02-02 PROCEDURE — 36415 COLL VENOUS BLD VENIPUNCTURE: CPT

## 2022-02-02 RX ORDER — FERROUS SULFATE 325(65) MG
325 TABLET ORAL
Qty: 90 TABLET | Refills: 0 | Status: SHIPPED | OUTPATIENT
Start: 2022-02-02

## 2022-02-02 NOTE — TELEPHONE ENCOUNTER
Called patient with lab results. He has not yet started the iron supplement. Resent iron supplement to our pharmacy and he agrees to  and start. Follow up labs ordered. Reviewed red flags of when to seek emergent medical care including blood in urine, stool, or vomit, or worsening lightheadedness, palpitations. Patient in agreement with plan.     Paula Lynch, APRN, CNP

## 2022-02-07 ENCOUNTER — LAB (OUTPATIENT)
Dept: LAB | Facility: CLINIC | Age: 42
End: 2022-02-07
Payer: COMMERCIAL

## 2022-02-07 DIAGNOSIS — D62 ANEMIA DUE TO BLOOD LOSS, ACUTE: Primary | ICD-10-CM

## 2022-02-07 DIAGNOSIS — D64.9 ANEMIA, UNSPECIFIED TYPE: ICD-10-CM

## 2022-02-07 LAB
ERYTHROCYTE [DISTWIDTH] IN BLOOD BY AUTOMATED COUNT: 17.5 % (ref 10–15)
HCT VFR BLD AUTO: 27.8 % (ref 40–53)
HGB BLD-MCNC: 7.9 G/DL (ref 13.3–17.7)
HOLD SPECIMEN: NORMAL
MCH RBC QN AUTO: 20.8 PG (ref 26.5–33)
MCHC RBC AUTO-ENTMCNC: 28.4 G/DL (ref 31.5–36.5)
MCV RBC AUTO: 73 FL (ref 78–100)
PLATELET # BLD AUTO: 232 10E3/UL (ref 150–450)
RBC # BLD AUTO: 3.8 10E6/UL (ref 4.4–5.9)
WBC # BLD AUTO: 4.9 10E3/UL (ref 4–11)

## 2022-02-07 PROCEDURE — 82728 ASSAY OF FERRITIN: CPT

## 2022-02-07 PROCEDURE — 83550 IRON BINDING TEST: CPT

## 2022-02-07 PROCEDURE — 36415 COLL VENOUS BLD VENIPUNCTURE: CPT

## 2022-02-07 PROCEDURE — 85027 COMPLETE CBC AUTOMATED: CPT

## 2022-02-08 LAB
FERRITIN SERPL-MCNC: 3 NG/ML (ref 26–388)
IRON SATN MFR SERPL: 17 % (ref 15–46)
IRON SERPL-MCNC: 79 UG/DL (ref 35–180)
TIBC SERPL-MCNC: 469 UG/DL (ref 240–430)

## 2022-02-17 ENCOUNTER — LAB (OUTPATIENT)
Dept: LAB | Facility: CLINIC | Age: 42
End: 2022-02-17
Payer: COMMERCIAL

## 2022-02-17 DIAGNOSIS — D62 ANEMIA DUE TO BLOOD LOSS, ACUTE: ICD-10-CM

## 2022-02-17 DIAGNOSIS — D62 ANEMIA DUE TO BLOOD LOSS, ACUTE: Primary | ICD-10-CM

## 2022-02-17 DIAGNOSIS — K62.5 RECTAL BLEEDING: ICD-10-CM

## 2022-02-17 LAB
HCT VFR BLD AUTO: 33.1 % (ref 40–53)
HGB BLD-MCNC: 9.5 G/DL (ref 13.3–17.7)

## 2022-02-17 PROCEDURE — 85014 HEMATOCRIT: CPT

## 2022-02-17 PROCEDURE — 85018 HEMOGLOBIN: CPT

## 2022-02-17 PROCEDURE — 36415 COLL VENOUS BLD VENIPUNCTURE: CPT

## 2022-02-20 ENCOUNTER — MYC MEDICAL ADVICE (OUTPATIENT)
Dept: FAMILY MEDICINE | Facility: CLINIC | Age: 42
End: 2022-02-20
Payer: COMMERCIAL

## 2022-02-20 DIAGNOSIS — D62 ANEMIA DUE TO BLOOD LOSS, ACUTE: Primary | ICD-10-CM

## 2022-09-17 ENCOUNTER — HEALTH MAINTENANCE LETTER (OUTPATIENT)
Age: 42
End: 2022-09-17

## 2023-05-06 ENCOUNTER — HEALTH MAINTENANCE LETTER (OUTPATIENT)
Age: 43
End: 2023-05-06

## 2024-07-14 ENCOUNTER — HEALTH MAINTENANCE LETTER (OUTPATIENT)
Age: 44
End: 2024-07-14